# Patient Record
Sex: FEMALE | Race: OTHER | Employment: FULL TIME | URBAN - METROPOLITAN AREA
[De-identification: names, ages, dates, MRNs, and addresses within clinical notes are randomized per-mention and may not be internally consistent; named-entity substitution may affect disease eponyms.]

---

## 2020-11-23 ENCOUNTER — OFFICE VISIT (OUTPATIENT)
Dept: FAMILY MEDICINE CLINIC | Facility: CLINIC | Age: 25
End: 2020-11-23
Payer: COMMERCIAL

## 2020-11-23 VITALS
BODY MASS INDEX: 23.05 KG/M2 | SYSTOLIC BLOOD PRESSURE: 100 MMHG | WEIGHT: 145.13 LBS | TEMPERATURE: 98 F | HEART RATE: 70 BPM | HEIGHT: 66.5 IN | DIASTOLIC BLOOD PRESSURE: 60 MMHG | RESPIRATION RATE: 16 BRPM

## 2020-11-23 DIAGNOSIS — Z00.00 WELL WOMAN EXAM (NO GYNECOLOGICAL EXAM): Primary | ICD-10-CM

## 2020-11-23 PROCEDURE — 99385 PREV VISIT NEW AGE 18-39: CPT | Performed by: NURSE PRACTITIONER

## 2020-11-23 PROCEDURE — 3078F DIAST BP <80 MM HG: CPT | Performed by: NURSE PRACTITIONER

## 2020-11-23 PROCEDURE — 3008F BODY MASS INDEX DOCD: CPT | Performed by: NURSE PRACTITIONER

## 2020-11-23 PROCEDURE — 3074F SYST BP LT 130 MM HG: CPT | Performed by: NURSE PRACTITIONER

## 2020-11-23 NOTE — PROGRESS NOTES
Maki Nnia is a 31year old female who presents to establish with our practice and for a complete physical exam:       Patient has no acute complaints. Feels well overall. Is , has no children. 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   mood changes or suicidal/homicidal ideations      EXAM:   /60   Pulse 70   Temp 98.2 °F (36.8 °C) (Temporal)   Resp 16   Ht 66.5\"   Wt 145 lb 2 oz (65.8 kg)   BMI 23.07 kg/m²   Body mass index is 23.07 kg/m².    GENERAL: well developed, well nourishe

## 2020-11-30 ENCOUNTER — APPOINTMENT (OUTPATIENT)
Dept: LAB | Age: 25
End: 2020-11-30
Attending: NURSE PRACTITIONER
Payer: COMMERCIAL

## 2020-12-03 ENCOUNTER — TELEPHONE (OUTPATIENT)
Dept: FAMILY MEDICINE CLINIC | Facility: CLINIC | Age: 25
End: 2020-12-03

## 2020-12-03 NOTE — TELEPHONE ENCOUNTER
Patient completed medical records release form to obtain records from previous provider, [facility].  Release faxed to [facility] [phone]

## 2021-02-04 ENCOUNTER — TELEPHONE (OUTPATIENT)
Dept: OBGYN CLINIC | Facility: CLINIC | Age: 26
End: 2021-02-04

## 2021-02-04 NOTE — TELEPHONE ENCOUNTER
Patient calling to initiate prenatal care  [id number]  Patient is 7-8 weeks on 03-04-21  Confirmation Ultrasound and Appointment scheduled on 03-08-21  [facility]   Good time to return phone call anytime

## 2021-02-05 NOTE — TELEPHONE ENCOUNTER
LMP: 2021    EDC based on lmp: 10/08/2021    8 wks on 2021        Are cycles regular?: Yes    Medical problems: None    Past sx hx: None    Current medications: PNV    Past pregnancy complications: NA    Current concerns: Patient had quest

## 2021-03-08 ENCOUNTER — ULTRASOUND ENCOUNTER (OUTPATIENT)
Dept: OBGYN CLINIC | Facility: CLINIC | Age: 26
End: 2021-03-08

## 2021-03-08 ENCOUNTER — OFFICE VISIT (OUTPATIENT)
Dept: OBGYN CLINIC | Facility: CLINIC | Age: 26
End: 2021-03-08

## 2021-03-08 VITALS
DIASTOLIC BLOOD PRESSURE: 64 MMHG | BODY MASS INDEX: 23.2 KG/M2 | HEIGHT: 66.25 IN | WEIGHT: 144.38 LBS | SYSTOLIC BLOOD PRESSURE: 118 MMHG | HEART RATE: 88 BPM

## 2021-03-08 DIAGNOSIS — N91.2 AMENORRHEA: Primary | ICD-10-CM

## 2021-03-08 DIAGNOSIS — Z32.01 PREGNANCY EXAMINATION OR TEST, POSITIVE RESULT: ICD-10-CM

## 2021-03-08 PROCEDURE — 76856 US EXAM PELVIC COMPLETE: CPT | Performed by: OBSTETRICS & GYNECOLOGY

## 2021-03-08 PROCEDURE — 3078F DIAST BP <80 MM HG: CPT | Performed by: OBSTETRICS & GYNECOLOGY

## 2021-03-08 PROCEDURE — 99203 OFFICE O/P NEW LOW 30 MIN: CPT | Performed by: OBSTETRICS & GYNECOLOGY

## 2021-03-08 PROCEDURE — 3008F BODY MASS INDEX DOCD: CPT | Performed by: OBSTETRICS & GYNECOLOGY

## 2021-03-08 PROCEDURE — 3074F SYST BP LT 130 MM HG: CPT | Performed by: OBSTETRICS & GYNECOLOGY

## 2021-03-08 RX ORDER — PRENATAL VIT/IRON FUM/FOLIC AC 27MG-0.8MG
1 TABLET ORAL DAILY
COMMUNITY

## 2021-03-08 NOTE — PATIENT INSTRUCTIONS
We discussed the available data which suggests that symptomatic pregnant patients with COVID-19 are at increased risk of more severe illness compared to nonpregnant women.  Fortunately, the absolute risk for severe COVID-19 is low, the data indicates an i testing, and it indicates that there might be a problem, and you subsequently decide not to do invasive testing such as an amniocentesis, would you worry excessively through the remainder of the pregnancy?     The following tests are available to screen for child. A blood test is performed on the mother, and if her test is positive, then the father should also be tested. These tests can be done at any time in the pregnancy, usually in the first trimester with your initial OB labs.  All babies are screened for [facility] which looks at many of the baby’s internal structures. Abnormalities in certain structures have been associated with an increased risk of genetic abnormalities. It also screens for NTD’s.  This ultrasound would replace the Level I Ultrasound that w meats in a package to 165 degrees Fahrenheit before eating, even if the label states the meat is “pre-cooked”. • Soft Cheeses and Unpasteurized Products - You may eat soft cheeses that are pasteurized.   Please avoid all soft cheeses, milk or juice that bleeding, or a watery discharge. Constipation: Metamucil, Colace, fiber supplement, Milk of Magnesia or Dulcolax  Drink plenty of fluids, eat high-fiber foods and take the above laxatives sporadically.      Headache or Mild aches and pain: Extra Strength

## 2021-03-08 NOTE — PROGRESS NOTES
[facility]  Obstetrics and Gynecology    Subjective:     Jaye Medley is a 31year old  female presents with c/o secondary amenorrhea and positive pregnancy test. The patient was recommended to return for further evaluation.  The patient Trimester US  GA by LMP: 9w3d  GA by US: 8w5d  CRL: 21.44 mm  FHT: 167 bpm  Impression: Single live IUP. Early viable. Gestational sac, yolk sac and fetal pole seen. Both ovaries wnl. No free fluid. Cardiac activity noted. Cervix appears closed and wnl.

## 2021-03-25 ENCOUNTER — TELEPHONE (OUTPATIENT)
Dept: OBGYN CLINIC | Facility: CLINIC | Age: 26
End: 2021-03-25

## 2021-03-25 NOTE — TELEPHONE ENCOUNTER
EDC 10/8/21; NOB scheduled for 3/29/21. Pt reports intermittent dull aching in pelvis. Pt denies any severe pain. Pt denies any spotting or bleeding.   Pt denies urinary problems but reports intermittent constipation alternating with loose stools which m

## 2021-03-25 NOTE — TELEPHONE ENCOUNTER
PT has questions regarding vaccinations    Future Appointments   Date Time Provider Patrick Brown   3/29/2021  2:45 PM Yesenia Gutierrez MD [facility]

## 2021-04-08 ENCOUNTER — INITIAL PRENATAL (OUTPATIENT)
Dept: OBGYN CLINIC | Facility: CLINIC | Age: 26
End: 2021-04-08

## 2021-04-08 VITALS
HEIGHT: 66.25 IN | BODY MASS INDEX: 23.92 KG/M2 | HEART RATE: 100 BPM | DIASTOLIC BLOOD PRESSURE: 72 MMHG | SYSTOLIC BLOOD PRESSURE: 110 MMHG | WEIGHT: 148.81 LBS

## 2021-04-08 DIAGNOSIS — Z12.4 ENCOUNTER FOR SCREENING FOR CERVICAL CANCER: ICD-10-CM

## 2021-04-08 DIAGNOSIS — N89.8 VAGINAL DISCHARGE: ICD-10-CM

## 2021-04-08 DIAGNOSIS — Z34.00 SUPERVISION OF NORMAL FIRST PREGNANCY, ANTEPARTUM: Primary | ICD-10-CM

## 2021-04-08 DIAGNOSIS — B37.9 YEAST INFECTION: ICD-10-CM

## 2021-04-08 DIAGNOSIS — N89.8 VAGINAL IRRITATION: ICD-10-CM

## 2021-04-08 PROCEDURE — 87086 URINE CULTURE/COLONY COUNT: CPT | Performed by: OBSTETRICS & GYNECOLOGY

## 2021-04-08 PROCEDURE — 81002 URINALYSIS NONAUTO W/O SCOPE: CPT | Performed by: OBSTETRICS & GYNECOLOGY

## 2021-04-08 PROCEDURE — 87624 HPV HI-RISK TYP POOLED RSLT: CPT | Performed by: OBSTETRICS & GYNECOLOGY

## 2021-04-08 PROCEDURE — 87480 CANDIDA DNA DIR PROBE: CPT | Performed by: OBSTETRICS & GYNECOLOGY

## 2021-04-08 PROCEDURE — 87491 CHLMYD TRACH DNA AMP PROBE: CPT | Performed by: OBSTETRICS & GYNECOLOGY

## 2021-04-08 PROCEDURE — 87591 N.GONORRHOEAE DNA AMP PROB: CPT | Performed by: OBSTETRICS & GYNECOLOGY

## 2021-04-08 PROCEDURE — 87389 HIV-1 AG W/HIV-1&-2 AB AG IA: CPT | Performed by: OBSTETRICS & GYNECOLOGY

## 2021-04-08 PROCEDURE — 88175 CYTOPATH C/V AUTO FLUID REDO: CPT | Performed by: OBSTETRICS & GYNECOLOGY

## 2021-04-08 PROCEDURE — 86900 BLOOD TYPING SEROLOGIC ABO: CPT | Performed by: OBSTETRICS & GYNECOLOGY

## 2021-04-08 PROCEDURE — 86762 RUBELLA ANTIBODY: CPT | Performed by: OBSTETRICS & GYNECOLOGY

## 2021-04-08 PROCEDURE — 87340 HEPATITIS B SURFACE AG IA: CPT | Performed by: OBSTETRICS & GYNECOLOGY

## 2021-04-08 PROCEDURE — 86780 TREPONEMA PALLIDUM: CPT | Performed by: OBSTETRICS & GYNECOLOGY

## 2021-04-08 PROCEDURE — 3074F SYST BP LT 130 MM HG: CPT | Performed by: OBSTETRICS & GYNECOLOGY

## 2021-04-08 PROCEDURE — 87660 TRICHOMONAS VAGIN DIR PROBE: CPT | Performed by: OBSTETRICS & GYNECOLOGY

## 2021-04-08 PROCEDURE — 87510 GARDNER VAG DNA DIR PROBE: CPT | Performed by: OBSTETRICS & GYNECOLOGY

## 2021-04-08 PROCEDURE — 86850 RBC ANTIBODY SCREEN: CPT | Performed by: OBSTETRICS & GYNECOLOGY

## 2021-04-08 PROCEDURE — 86901 BLOOD TYPING SEROLOGIC RH(D): CPT | Performed by: OBSTETRICS & GYNECOLOGY

## 2021-04-08 PROCEDURE — 3078F DIAST BP <80 MM HG: CPT | Performed by: OBSTETRICS & GYNECOLOGY

## 2021-04-08 PROCEDURE — 85025 COMPLETE CBC W/AUTO DIFF WBC: CPT | Performed by: OBSTETRICS & GYNECOLOGY

## 2021-04-08 PROCEDURE — 3008F BODY MASS INDEX DOCD: CPT | Performed by: OBSTETRICS & GYNECOLOGY

## 2021-04-08 NOTE — TELEPHONE ENCOUNTER
Per notes, patient had negative rapid test 2 weeks ago. If patient has not had a positive test and has no symptoms, OK to be seen.

## 2021-04-08 NOTE — PATIENT INSTRUCTIONS
Foods to Avoid During Pregnancy  • Deli Meats- You may eat deli meats from the deli. If you eat deli meats in the package, it may be contaminated with Listeria.  Heat all deli meats in a package to 165 degrees Fahrenheit before eating, even if the label st Annusol, Sitz bath or Witch hazel  Eat a high fiber diet and drink plenty of fluids. Yeast: Monistat 3 or 7  Call if your symptoms do not improve, or if you experience vaginal bleeding, or a watery discharge.     Constipation: Metamucil, Colace, fiber nagel

## 2021-04-08 NOTE — PROGRESS NOTES
[facility]  Obstetrics and Gynecology  History & Physical    CC: Patient is here to establish prenatal care     Subjective:     HPI:  Jamaal Barker is a 31year old  female at [address] [place] [address] who presents today to establish prenatal care.  [employer]:  General:  denies fevers, chills, fatigue and malaise  Eyes:  no visual changes, denies headaches  Breast:  denies rashes, skin changes, pain, lumps or discharge   Respiratory:  denies SOB, dyspnea, cough or wheezing  Cardiovascular:  denies chest Amniocentesis and declined. Pt declines genetic testing.       Patient education  - Pt counseled on safety, diet, exercise, caffiene, tobacco, ETOH, sexual activity, ER precautions, diet, exercise, appropriate weight gain, travel, appropriate otc medication

## 2021-04-09 PROBLEM — B37.9 YEAST INFECTION: Status: ACTIVE | Noted: 2021-04-09

## 2021-05-03 ENCOUNTER — ROUTINE PRENATAL (OUTPATIENT)
Dept: OBGYN CLINIC | Facility: CLINIC | Age: 26
End: 2021-05-03

## 2021-05-03 VITALS — BODY MASS INDEX: 24 KG/M2 | DIASTOLIC BLOOD PRESSURE: 66 MMHG | SYSTOLIC BLOOD PRESSURE: 122 MMHG | WEIGHT: 151 LBS

## 2021-05-03 DIAGNOSIS — Z34.00 SUPERVISION OF NORMAL FIRST PREGNANCY, ANTEPARTUM: Primary | ICD-10-CM

## 2021-05-03 DIAGNOSIS — D72.828 OTHER ELEVATED WHITE BLOOD CELL (WBC) COUNT: ICD-10-CM

## 2021-05-03 PROBLEM — O09.899 RUBELLA NON-IMMUNE STATUS, ANTEPARTUM (HCC): Status: ACTIVE | Noted: 2021-05-03

## 2021-05-03 PROBLEM — O09.899 RUBELLA NON-IMMUNE STATUS, ANTEPARTUM: Status: ACTIVE | Noted: 2021-05-03

## 2021-05-03 PROBLEM — Z28.3 RUBELLA NON-IMMUNE STATUS, ANTEPARTUM: Status: ACTIVE | Noted: 2021-05-03

## 2021-05-03 PROBLEM — O99.891 RUBELLA NON-IMMUNE STATUS, ANTEPARTUM: Status: ACTIVE | Noted: 2021-05-03

## 2021-05-03 PROBLEM — Z28.39 RUBELLA NON-IMMUNE STATUS, ANTEPARTUM (HCC): Status: ACTIVE | Noted: 2021-05-03

## 2021-05-03 PROBLEM — Z28.39 RUBELLA NON-IMMUNE STATUS, ANTEPARTUM: Status: ACTIVE | Noted: 2021-05-03

## 2021-05-03 PROCEDURE — 85025 COMPLETE CBC W/AUTO DIFF WBC: CPT | Performed by: OBSTETRICS & GYNECOLOGY

## 2021-05-03 PROCEDURE — 81002 URINALYSIS NONAUTO W/O SCOPE: CPT | Performed by: OBSTETRICS & GYNECOLOGY

## 2021-05-03 PROCEDURE — 3078F DIAST BP <80 MM HG: CPT | Performed by: OBSTETRICS & GYNECOLOGY

## 2021-05-03 PROCEDURE — 3074F SYST BP LT 130 MM HG: CPT | Performed by: OBSTETRICS & GYNECOLOGY

## 2021-05-03 NOTE — PROGRESS NOTES
Patient presents with:  Prenatal Care    Routine prenatal visit without complaints. Patient denies any bleeding, leaking fluid, cramping, or contractions. Good fetal movement.   Assessment/Plan:  17w3d doing well  Ultrasound next  Recommend avoid repetitiv

## 2021-05-26 ENCOUNTER — ROUTINE PRENATAL (OUTPATIENT)
Dept: OBGYN CLINIC | Facility: CLINIC | Age: 26
End: 2021-05-26

## 2021-05-26 ENCOUNTER — ULTRASOUND ENCOUNTER (OUTPATIENT)
Dept: OBGYN CLINIC | Facility: CLINIC | Age: 26
End: 2021-05-26

## 2021-05-26 VITALS — BODY MASS INDEX: 25 KG/M2 | DIASTOLIC BLOOD PRESSURE: 62 MMHG | WEIGHT: 155.81 LBS | SYSTOLIC BLOOD PRESSURE: 116 MMHG

## 2021-05-26 DIAGNOSIS — Z36.9 ANTENATAL SCREENING ENCOUNTER: ICD-10-CM

## 2021-05-26 DIAGNOSIS — Z3A.20 20 WEEKS GESTATION OF PREGNANCY: Primary | ICD-10-CM

## 2021-05-26 DIAGNOSIS — Z36.89 SCREENING, ANTENATAL, FOR FETAL ANATOMIC SURVEY: ICD-10-CM

## 2021-05-26 PROCEDURE — 76805 OB US >/= 14 WKS SNGL FETUS: CPT | Performed by: OBSTETRICS & GYNECOLOGY

## 2021-05-26 PROCEDURE — 3074F SYST BP LT 130 MM HG: CPT | Performed by: OBSTETRICS & GYNECOLOGY

## 2021-05-26 PROCEDURE — 81002 URINALYSIS NONAUTO W/O SCOPE: CPT | Performed by: OBSTETRICS & GYNECOLOGY

## 2021-05-26 PROCEDURE — 3078F DIAST BP <80 MM HG: CPT | Performed by: OBSTETRICS & GYNECOLOGY

## 2021-05-26 NOTE — PROGRESS NOTES
Patient presents with:  Prenatal Care: FRANCIA after US     Routine prenatal visit. Patient without complaints. Good fetal movement. Patient denies any bleeding, leaking fluid, cramping, or contractions.     Assessment/Plan:  20w5d doing well  1 hr GTT and CBC

## 2021-06-07 ENCOUNTER — LAB ENCOUNTER (OUTPATIENT)
Dept: LAB | Age: 26
End: 2021-06-07
Attending: OBSTETRICS & GYNECOLOGY
Payer: COMMERCIAL

## 2021-06-07 DIAGNOSIS — Z36.9 ANTENATAL SCREENING ENCOUNTER: ICD-10-CM

## 2021-06-07 PROCEDURE — 82950 GLUCOSE TEST: CPT

## 2021-06-07 PROCEDURE — 36415 COLL VENOUS BLD VENIPUNCTURE: CPT

## 2021-06-07 PROCEDURE — 85025 COMPLETE CBC W/AUTO DIFF WBC: CPT

## 2021-06-21 ENCOUNTER — ROUTINE PRENATAL (OUTPATIENT)
Dept: OBGYN CLINIC | Facility: CLINIC | Age: 26
End: 2021-06-21

## 2021-06-21 VITALS — WEIGHT: 161.81 LBS | BODY MASS INDEX: 26 KG/M2 | DIASTOLIC BLOOD PRESSURE: 60 MMHG | SYSTOLIC BLOOD PRESSURE: 108 MMHG

## 2021-06-21 DIAGNOSIS — Z34.00 SUPERVISION OF NORMAL FIRST PREGNANCY, ANTEPARTUM: Primary | ICD-10-CM

## 2021-06-21 PROBLEM — O99.013 ANEMIA DURING PREGNANCY IN THIRD TRIMESTER: Status: ACTIVE | Noted: 2021-06-21

## 2021-06-21 PROBLEM — O99.013 ANEMIA DURING PREGNANCY IN THIRD TRIMESTER (HCC): Status: ACTIVE | Noted: 2021-06-21

## 2021-06-21 PROCEDURE — 81002 URINALYSIS NONAUTO W/O SCOPE: CPT | Performed by: OBSTETRICS & GYNECOLOGY

## 2021-06-21 PROCEDURE — 3074F SYST BP LT 130 MM HG: CPT | Performed by: OBSTETRICS & GYNECOLOGY

## 2021-06-21 PROCEDURE — 3078F DIAST BP <80 MM HG: CPT | Performed by: OBSTETRICS & GYNECOLOGY

## 2021-06-21 NOTE — PATIENT INSTRUCTIONS
Tdap Vaccine: What You Need To Know    1. Why Get Vaccinated:    · Tetanus, diphtheria, and pertussis can be very serious diseases, even for adolescents and adults. Tdap vaccine can protect us from these diseases.     · Tetanus (Lockjaw) causes painful mu tetanus infection.           Pediatricians:     [address] [facility] [address]   Maria Alejandra, [facility]     [address],  [address]   Maria Alejandra, [address] [place] [address] Tome Dexter Bolanos [address]   N

## 2021-06-21 NOTE — PROGRESS NOTES
FRANCIA 24w2d    Doing well, +FM  No contractions   LOF, VB      1. FHT-present  2. PNL:  1 hour/CBC done too early. Aware she will repeat it  3. Anemia: she is concerned the amount of dairy she eats may be negating the daily iron she is taking.  She is taking

## 2021-06-22 ENCOUNTER — TELEPHONE (OUTPATIENT)
Dept: OBGYN CLINIC | Facility: CLINIC | Age: 26
End: 2021-06-22

## 2021-06-22 NOTE — TELEPHONE ENCOUNTER
----- Message from Wesley Jerome MD sent at 6/21/2021  5:44 PM CDT -----  Regarding: breast pump  Pt needs breast pump referral, please help. Thanks!

## 2021-07-19 ENCOUNTER — LAB ENCOUNTER (OUTPATIENT)
Dept: LAB | Age: 26
End: 2021-07-19
Attending: OBSTETRICS & GYNECOLOGY
Payer: COMMERCIAL

## 2021-07-19 ENCOUNTER — ROUTINE PRENATAL (OUTPATIENT)
Dept: OBGYN CLINIC | Facility: CLINIC | Age: 26
End: 2021-07-19

## 2021-07-19 ENCOUNTER — TELEPHONE (OUTPATIENT)
Dept: OBGYN CLINIC | Facility: CLINIC | Age: 26
End: 2021-07-19

## 2021-07-19 VITALS
HEIGHT: 66.25 IN | SYSTOLIC BLOOD PRESSURE: 122 MMHG | BODY MASS INDEX: 26.84 KG/M2 | DIASTOLIC BLOOD PRESSURE: 78 MMHG | WEIGHT: 167 LBS

## 2021-07-19 DIAGNOSIS — Z34.90 PREGNANCY, UNSPECIFIED GESTATIONAL AGE: ICD-10-CM

## 2021-07-19 DIAGNOSIS — Z34.82 ENCOUNTER FOR SUPERVISION OF OTHER NORMAL PREGNANCY IN SECOND TRIMESTER: Primary | ICD-10-CM

## 2021-07-19 DIAGNOSIS — Z36.9 ANTENATAL SCREENING ENCOUNTER: ICD-10-CM

## 2021-07-19 DIAGNOSIS — Z34.00 SUPERVISION OF NORMAL FIRST PREGNANCY, ANTEPARTUM: ICD-10-CM

## 2021-07-19 DIAGNOSIS — Z23 NEED FOR VACCINATION: ICD-10-CM

## 2021-07-19 LAB
BASOPHILS # BLD AUTO: 0.04 X10(3) UL (ref 0–0.2)
BASOPHILS NFR BLD AUTO: 0.3 %
DEPRECATED RDW RBC AUTO: 48.5 FL (ref 35.1–46.3)
EOSINOPHIL # BLD AUTO: 0.19 X10(3) UL (ref 0–0.7)
EOSINOPHIL NFR BLD AUTO: 1.3 %
ERYTHROCYTE [DISTWIDTH] IN BLOOD BY AUTOMATED COUNT: 14.6 % (ref 11–15)
GLUCOSE (URINE DIPSTICK): NEGATIVE MG/DL
GLUCOSE 1H P GLC SERPL-MCNC: 96 MG/DL
HCT VFR BLD AUTO: 33.3 %
HGB BLD-MCNC: 11.3 G/DL
IMM GRANULOCYTES # BLD AUTO: 0.16 X10(3) UL (ref 0–1)
IMM GRANULOCYTES NFR BLD: 1.1 %
LYMPHOCYTES # BLD AUTO: 3.24 X10(3) UL (ref 1–4)
LYMPHOCYTES NFR BLD AUTO: 21.3 %
MCH RBC QN AUTO: 30.8 PG (ref 26–34)
MCHC RBC AUTO-ENTMCNC: 33.9 G/DL (ref 31–37)
MCV RBC AUTO: 90.7 FL
MONOCYTES # BLD AUTO: 1.15 X10(3) UL (ref 0.1–1)
MONOCYTES NFR BLD AUTO: 7.6 %
MULTISTIX LOT#: NORMAL NUMERIC
NEUTROPHILS # BLD AUTO: 10.4 X10 (3) UL (ref 1.5–7.7)
NEUTROPHILS # BLD AUTO: 10.4 X10(3) UL (ref 1.5–7.7)
NEUTROPHILS NFR BLD AUTO: 68.4 %
PLATELET # BLD AUTO: 215 10(3)UL (ref 150–450)
RBC # BLD AUTO: 3.67 X10(6)UL
WBC # BLD AUTO: 15.2 X10(3) UL (ref 4–11)

## 2021-07-19 PROCEDURE — 90471 IMMUNIZATION ADMIN: CPT | Performed by: OBSTETRICS & GYNECOLOGY

## 2021-07-19 PROCEDURE — 90715 TDAP VACCINE 7 YRS/> IM: CPT | Performed by: OBSTETRICS & GYNECOLOGY

## 2021-07-19 PROCEDURE — 3078F DIAST BP <80 MM HG: CPT | Performed by: OBSTETRICS & GYNECOLOGY

## 2021-07-19 PROCEDURE — 85025 COMPLETE CBC W/AUTO DIFF WBC: CPT

## 2021-07-19 PROCEDURE — 36415 COLL VENOUS BLD VENIPUNCTURE: CPT

## 2021-07-19 PROCEDURE — 3008F BODY MASS INDEX DOCD: CPT | Performed by: OBSTETRICS & GYNECOLOGY

## 2021-07-19 PROCEDURE — 81002 URINALYSIS NONAUTO W/O SCOPE: CPT | Performed by: OBSTETRICS & GYNECOLOGY

## 2021-07-19 PROCEDURE — 3074F SYST BP LT 130 MM HG: CPT | Performed by: OBSTETRICS & GYNECOLOGY

## 2021-07-19 PROCEDURE — 82950 GLUCOSE TEST: CPT

## 2021-07-19 NOTE — PATIENT INSTRUCTIONS
LABOR & DELIVERY PRE-REGISTRATION    Thank you for choosing [facility] for you labor and delivery needs. We look forward to caring for you and your family in this exciting time.   In order to better care for all of our patients, [facility] re CHART    Although not all women need to perform daily fetal movement counts, if you notice a decrease in fetal activity, you should contact our office immediately. Begin counting fetal movements at 32 weeks of pregnancy.   You may find that your baby i that all pregnant women receive a Tdap vaccine during pregnancy, regardless of whether you have received one previously.   The vaccine reduces your chances of frandy the illness, and also provides some natural immunity to your baby for possible exposur Covid-19 infection and have a subsequent higher risk for the following:   - severe illness   - adverse pregnancy outcomes including  birth   - hospitalization, ICU admission, need for mechanical ventilation and death  [2] The mRNA vaccines that are

## 2021-07-19 NOTE — TELEPHONE ENCOUNTER
----- Message from Dov King MD sent at 7/19/2021  1:28 PM CDT -----  Needs referral to lactation consultant.   [facility]

## 2021-07-19 NOTE — PROGRESS NOTES
Patient presents with:  Prenatal Care: FRANCIA    Routine prenatal visit. Patient without complaints. Good fetal movement  Patient denies any bleeding, leaking fluid, cramping, or contractions.     Assessment/Plan:  28w3d doing well  Desires referral to kanika

## 2021-08-02 ENCOUNTER — ROUTINE PRENATAL (OUTPATIENT)
Dept: OBGYN CLINIC | Facility: CLINIC | Age: 26
End: 2021-08-02

## 2021-08-02 VITALS
WEIGHT: 172 LBS | SYSTOLIC BLOOD PRESSURE: 110 MMHG | DIASTOLIC BLOOD PRESSURE: 70 MMHG | BODY MASS INDEX: 27.64 KG/M2 | HEIGHT: 66.25 IN

## 2021-08-02 DIAGNOSIS — Z36.9 PRENATAL SCREENING ENCOUNTER: Primary | ICD-10-CM

## 2021-08-02 DIAGNOSIS — Z34.00 SUPERVISION OF NORMAL FIRST PREGNANCY, ANTEPARTUM: ICD-10-CM

## 2021-08-02 LAB
GLUCOSE (URINE DIPSTICK): NEGATIVE MG/DL
MULTISTIX LOT#: NORMAL NUMERIC

## 2021-08-02 PROCEDURE — 3008F BODY MASS INDEX DOCD: CPT | Performed by: OBSTETRICS & GYNECOLOGY

## 2021-08-02 PROCEDURE — 3078F DIAST BP <80 MM HG: CPT | Performed by: OBSTETRICS & GYNECOLOGY

## 2021-08-02 PROCEDURE — 81002 URINALYSIS NONAUTO W/O SCOPE: CPT | Performed by: OBSTETRICS & GYNECOLOGY

## 2021-08-02 PROCEDURE — 3074F SYST BP LT 130 MM HG: CPT | Performed by: OBSTETRICS & GYNECOLOGY

## 2021-08-02 RX ORDER — MELATONIN
325
COMMUNITY

## 2021-08-02 NOTE — PROGRESS NOTES
FRANCIA    GA: 30w3d   21  1504   BP: 110/70   Weight: 172 lb (78 kg)   Height: 66.25\"       Doing well, +FM  Denies LOF/VB/uctx  Rh positive, TDAP received, EPDS 0  Fetal movement count given  Pt reports she is likely to stop working at 38 weeks

## 2021-08-09 ENCOUNTER — NURSE ONLY (OUTPATIENT)
Dept: LACTATION | Facility: HOSPITAL | Age: 26
End: 2021-08-09
Attending: OBSTETRICS & GYNECOLOGY
Payer: COMMERCIAL

## 2021-08-09 VITALS — TEMPERATURE: 98 F

## 2021-08-09 DIAGNOSIS — Z71.89 PRENATAL CONSULT: Primary | ICD-10-CM

## 2021-08-09 PROCEDURE — 99213 OFFICE O/P EST LOW 20 MIN: CPT

## 2021-08-09 NOTE — PROGRESS NOTES
Condition is stable and well controlled. Continue with ferrous sulfate 325 mg PO QD. Labs ordered. Pending lab results, we will reassess the condition at next regular appointment. LACTATION NOTE - MOTHER      Evaluation Type: Outpatient Initial (Prenatal education visit)    Problems identified  Problems identified: Knowledge deficit  Problems Identified Other: Patient desires assistance with personal breast pump    Maternal history

## 2021-08-17 ENCOUNTER — ROUTINE PRENATAL (OUTPATIENT)
Dept: OBGYN CLINIC | Facility: CLINIC | Age: 26
End: 2021-08-17

## 2021-08-17 VITALS
SYSTOLIC BLOOD PRESSURE: 100 MMHG | WEIGHT: 172 LBS | HEIGHT: 66.25 IN | BODY MASS INDEX: 27.64 KG/M2 | DIASTOLIC BLOOD PRESSURE: 60 MMHG

## 2021-08-17 DIAGNOSIS — Z34.90 PRENATAL CARE, ANTEPARTUM: Primary | ICD-10-CM

## 2021-08-17 DIAGNOSIS — Z34.00 SUPERVISION OF NORMAL FIRST PREGNANCY, ANTEPARTUM: ICD-10-CM

## 2021-08-17 PROBLEM — B37.9 YEAST INFECTION: Status: RESOLVED | Noted: 2021-04-09 | Resolved: 2021-08-17

## 2021-08-17 LAB
GLUCOSE (URINE DIPSTICK): NEGATIVE MG/DL
MULTISTIX LOT#: NORMAL NUMERIC

## 2021-08-17 PROCEDURE — 3008F BODY MASS INDEX DOCD: CPT | Performed by: OBSTETRICS & GYNECOLOGY

## 2021-08-17 PROCEDURE — 81002 URINALYSIS NONAUTO W/O SCOPE: CPT | Performed by: OBSTETRICS & GYNECOLOGY

## 2021-08-17 PROCEDURE — 3074F SYST BP LT 130 MM HG: CPT | Performed by: OBSTETRICS & GYNECOLOGY

## 2021-08-17 PROCEDURE — 3078F DIAST BP <80 MM HG: CPT | Performed by: OBSTETRICS & GYNECOLOGY

## 2021-08-17 PROCEDURE — 87389 HIV-1 AG W/HIV-1&-2 AB AG IA: CPT | Performed by: OBSTETRICS & GYNECOLOGY

## 2021-08-17 NOTE — PATIENT INSTRUCTIONS
False Labor  True labor contractions can start unevenly but soon take on a regular pattern. As time goes on, the contractions will get stronger. Also, the intervals between the contractions will get shorter.  Even at the onset, these contractions last at medical advice  Call your healthcare provider right away if any of these occur:  · You are fewer than 37 weeks along in your pregnancy and you’re having contractions. · You have contractions that are regular, getting longer, stronger, and closer together.

## 2021-08-17 NOTE — PROGRESS NOTES
32w4d seen for routine OB visit. Denies ctx, lof, vb. Reports good FM.     Patient Active Problem List:     Supervision of normal first pregnancy, antepartum     Rubella non-immune status, antepartum     Anemia during pregnancy in third trimester       Ge

## 2021-08-30 ENCOUNTER — ROUTINE PRENATAL (OUTPATIENT)
Dept: OBGYN CLINIC | Facility: CLINIC | Age: 26
End: 2021-08-30

## 2021-08-30 VITALS — SYSTOLIC BLOOD PRESSURE: 102 MMHG | BODY MASS INDEX: 28 KG/M2 | WEIGHT: 175 LBS | DIASTOLIC BLOOD PRESSURE: 60 MMHG

## 2021-08-30 DIAGNOSIS — Z3A.34 34 WEEKS GESTATION OF PREGNANCY: Primary | ICD-10-CM

## 2021-08-30 DIAGNOSIS — Z34.00 SUPERVISION OF NORMAL FIRST PREGNANCY, ANTEPARTUM: ICD-10-CM

## 2021-08-30 LAB
GLUCOSE (URINE DIPSTICK): NEGATIVE MG/DL
MULTISTIX LOT#: NORMAL NUMERIC
PROTEIN (URINE DIPSTICK): NEGATIVE MG/DL

## 2021-08-30 PROCEDURE — 3078F DIAST BP <80 MM HG: CPT | Performed by: OBSTETRICS & GYNECOLOGY

## 2021-08-30 PROCEDURE — 3074F SYST BP LT 130 MM HG: CPT | Performed by: OBSTETRICS & GYNECOLOGY

## 2021-08-30 PROCEDURE — 81002 URINALYSIS NONAUTO W/O SCOPE: CPT | Performed by: OBSTETRICS & GYNECOLOGY

## 2021-08-30 NOTE — PROGRESS NOTES
34w3d seen for routine OB visit. Denies ctx, lof, vb. Reports good FM.     Patient Active Problem List:     Supervision of normal first pregnancy, antepartum     Rubella non-immune status, antepartum     Anemia during pregnancy in third trimester       Ge

## 2021-09-02 ENCOUNTER — TELEPHONE (OUTPATIENT)
Dept: OBGYN CLINIC | Facility: CLINIC | Age: 26
End: 2021-09-02

## 2021-09-02 NOTE — TELEPHONE ENCOUNTER
Pt. Dropped off fmla / disability paper work to be completed , form fee paid , pt. Will call with fax number. Placed forms in [facility].

## 2021-09-09 ENCOUNTER — MED REC SCAN ONLY (OUTPATIENT)
Dept: OBGYN CLINIC | Facility: CLINIC | Age: 26
End: 2021-09-09

## 2021-09-09 ENCOUNTER — TELEPHONE (OUTPATIENT)
Dept: OBGYN CLINIC | Facility: CLINIC | Age: 26
End: 2021-09-09

## 2021-09-09 NOTE — TELEPHONE ENCOUNTER
G1/P 0 GA 35 6/7 wks patient complaining of fall at work today at [address] [place] [address] backwards off of something that she was sitting on. Landed on her backside and against a wall. Next QUINN is scheduled for Monday.   Last OV: 8/30/21 quinn with Dr. Emperatriz Talbert

## 2021-09-10 NOTE — TELEPHONE ENCOUNTER
Signed forms received    Copy to scan  Copy to file in [address]  Copy of FMLA faxed to [phone]  Attending physician's statement faxed to Rudy Chapin at [phone]

## 2021-09-13 ENCOUNTER — ROUTINE PRENATAL (OUTPATIENT)
Dept: OBGYN CLINIC | Facility: CLINIC | Age: 26
End: 2021-09-13

## 2021-09-13 VITALS — WEIGHT: 178 LBS | BODY MASS INDEX: 29 KG/M2 | SYSTOLIC BLOOD PRESSURE: 116 MMHG | DIASTOLIC BLOOD PRESSURE: 74 MMHG

## 2021-09-13 DIAGNOSIS — Z34.00 SUPERVISION OF NORMAL FIRST PREGNANCY, ANTEPARTUM: ICD-10-CM

## 2021-09-13 DIAGNOSIS — Z3A.36 36 WEEKS GESTATION OF PREGNANCY: Primary | ICD-10-CM

## 2021-09-13 PROCEDURE — 81002 URINALYSIS NONAUTO W/O SCOPE: CPT | Performed by: NURSE PRACTITIONER

## 2021-09-13 PROCEDURE — 3078F DIAST BP <80 MM HG: CPT | Performed by: NURSE PRACTITIONER

## 2021-09-13 PROCEDURE — 3074F SYST BP LT 130 MM HG: CPT | Performed by: NURSE PRACTITIONER

## 2021-09-13 PROCEDURE — 87081 CULTURE SCREEN ONLY: CPT | Performed by: NURSE PRACTITIONER

## 2021-09-13 NOTE — PROGRESS NOTES
FRANCIA  Doing well, +FM  Denies VB/LOF/uctx  Mode of delivery:  anticipated  Labor precautions discussed  GBS collected   RTC 1 week

## 2021-09-20 ENCOUNTER — ROUTINE PRENATAL (OUTPATIENT)
Dept: OBGYN CLINIC | Facility: CLINIC | Age: 26
End: 2021-09-20

## 2021-09-20 VITALS
WEIGHT: 179.81 LBS | HEIGHT: 66.25 IN | BODY MASS INDEX: 28.9 KG/M2 | SYSTOLIC BLOOD PRESSURE: 112 MMHG | DIASTOLIC BLOOD PRESSURE: 70 MMHG

## 2021-09-20 DIAGNOSIS — Z34.00 SUPERVISION OF NORMAL FIRST PREGNANCY, ANTEPARTUM: Primary | ICD-10-CM

## 2021-09-20 PROBLEM — O99.820 GBS (GROUP B STREPTOCOCCUS CARRIER), +RV CULTURE, CURRENTLY PREGNANT: Status: ACTIVE | Noted: 2021-09-20

## 2021-09-20 PROBLEM — O99.820 GBS (GROUP B STREPTOCOCCUS CARRIER), +RV CULTURE, CURRENTLY PREGNANT (HCC): Status: ACTIVE | Noted: 2021-09-20

## 2021-09-20 PROCEDURE — 3008F BODY MASS INDEX DOCD: CPT | Performed by: OBSTETRICS & GYNECOLOGY

## 2021-09-20 PROCEDURE — 81002 URINALYSIS NONAUTO W/O SCOPE: CPT | Performed by: OBSTETRICS & GYNECOLOGY

## 2021-09-20 PROCEDURE — 3078F DIAST BP <80 MM HG: CPT | Performed by: OBSTETRICS & GYNECOLOGY

## 2021-09-20 PROCEDURE — 3074F SYST BP LT 130 MM HG: CPT | Performed by: OBSTETRICS & GYNECOLOGY

## 2021-09-20 NOTE — PROGRESS NOTES
FRANCIA    GA: 37w3d   21  1421   BP: 112/70   Weight: 179 lb 12.8 oz (81.6 kg)   Height: 66.25\"       Doing well, +FM  Denies LOF/VB/uctx  Mode of delivery:  anticipated  SVE deferred   GBS positive  Fetal movement count given  Labor precauti

## 2021-09-20 NOTE — PATIENT INSTRUCTIONS
Labor Instructions    How do I know if it’s true labor? • One of the most important aspects of any pregnancy is being able to recognize the onset of labor.   Unfortunately, on occasion it can be difficult or confusing, especially if you have had one or mor of the contractions. Having regular (usually closer), longer lasting (35-70 seconds), and sharper (more painful) contractions are the common symptoms of actual labor.   The second way in which labor can begin which occurs in approximately 30% of all patien the room during your labor. During the delivery, the nurses will inform you of the hospital policy and how many coaches are allowed. You may desire pain medication or anesthesia for pain.   You probably discussed some aspects of pain medication with us dur Please call the office within a few days after you are discharged from the hospital to schedule your post-partum visit, which is usually 4-6 weeks after delivery. Any medications necessary will be discussed on an individual basis.   If you decide to armin Time M T W Th F S S                                                                                                           Time M T W Th F S S

## 2021-09-30 ENCOUNTER — ROUTINE PRENATAL (OUTPATIENT)
Dept: OBGYN CLINIC | Facility: CLINIC | Age: 26
End: 2021-09-30

## 2021-09-30 ENCOUNTER — TELEPHONE (OUTPATIENT)
Dept: OBGYN CLINIC | Facility: CLINIC | Age: 26
End: 2021-09-30

## 2021-09-30 VITALS — SYSTOLIC BLOOD PRESSURE: 104 MMHG | DIASTOLIC BLOOD PRESSURE: 70 MMHG | WEIGHT: 181.19 LBS | BODY MASS INDEX: 29 KG/M2

## 2021-09-30 DIAGNOSIS — Z34.00 SUPERVISION OF NORMAL FIRST PREGNANCY, ANTEPARTUM: Primary | ICD-10-CM

## 2021-09-30 DIAGNOSIS — Z3A.38 38 WEEKS GESTATION OF PREGNANCY: ICD-10-CM

## 2021-09-30 DIAGNOSIS — Z23 NEED FOR VACCINATION: ICD-10-CM

## 2021-09-30 LAB
GLUCOSE (URINE DIPSTICK): NEGATIVE MG/DL
MULTISTIX LOT#: 1055 NUMERIC
PROTEIN (URINE DIPSTICK): NEGATIVE MG/DL

## 2021-09-30 PROCEDURE — 90471 IMMUNIZATION ADMIN: CPT | Performed by: OBSTETRICS & GYNECOLOGY

## 2021-09-30 PROCEDURE — 3074F SYST BP LT 130 MM HG: CPT | Performed by: OBSTETRICS & GYNECOLOGY

## 2021-09-30 PROCEDURE — 90686 IIV4 VACC NO PRSV 0.5 ML IM: CPT | Performed by: OBSTETRICS & GYNECOLOGY

## 2021-09-30 PROCEDURE — 81002 URINALYSIS NONAUTO W/O SCOPE: CPT | Performed by: OBSTETRICS & GYNECOLOGY

## 2021-09-30 PROCEDURE — 3078F DIAST BP <80 MM HG: CPT | Performed by: OBSTETRICS & GYNECOLOGY

## 2021-09-30 NOTE — PROGRESS NOTES
FRANCIA - 38w6d    Doing well, +FM  Denies LOF/VB/uctx  /70   Wt 181 lb 3.2 oz (82.2 kg)   LMP 01/01/2021 (Exact Date)   BMI 29.03 kg/m²    Lab Results   Component Value Date    GBS Streptococcus agalactiae (Group B beta strep) (A) 09/13/2021      IOL at

## 2021-10-01 ENCOUNTER — TELEPHONE (OUTPATIENT)
Dept: OBGYN CLINIC | Facility: CLINIC | Age: 26
End: 2021-10-01

## 2021-10-01 DIAGNOSIS — Z01.812 PRE-PROCEDURAL LABORATORY EXAMINATION: Primary | ICD-10-CM

## 2021-10-01 NOTE — TELEPHONE ENCOUNTER
----- Message from [facility], MD sent at 9/30/2021  1:28 PM CDT -----  Cytotec on 10/14 at 7:15 pm

## 2021-10-01 NOTE — TELEPHONE ENCOUNTER
39W0D called asking if OK for prenatal massage. Patient also stated that she lost her mucous plug this morning. She denies any ROM/VB/UCX. + FM.    Last OV:    Pregnancy Complications: NA  Abdominal pain: N  Leaking of fluid: N  Vaginal B

## 2021-10-07 ENCOUNTER — ROUTINE PRENATAL (OUTPATIENT)
Dept: OBGYN CLINIC | Facility: CLINIC | Age: 26
End: 2021-10-07

## 2021-10-07 VITALS — BODY MASS INDEX: 30 KG/M2 | SYSTOLIC BLOOD PRESSURE: 94 MMHG | WEIGHT: 184.81 LBS | DIASTOLIC BLOOD PRESSURE: 66 MMHG

## 2021-10-07 DIAGNOSIS — Z34.90 PRENATAL CARE, ANTEPARTUM: Primary | ICD-10-CM

## 2021-10-07 DIAGNOSIS — Z34.00 SUPERVISION OF NORMAL FIRST PREGNANCY, ANTEPARTUM: ICD-10-CM

## 2021-10-07 PROCEDURE — 81002 URINALYSIS NONAUTO W/O SCOPE: CPT | Performed by: OBSTETRICS & GYNECOLOGY

## 2021-10-07 PROCEDURE — 3078F DIAST BP <80 MM HG: CPT | Performed by: OBSTETRICS & GYNECOLOGY

## 2021-10-07 PROCEDURE — 3074F SYST BP LT 130 MM HG: CPT | Performed by: OBSTETRICS & GYNECOLOGY

## 2021-10-07 NOTE — PROGRESS NOTES
39w6d seen for routine OB visit. Denies ctx, lof, vb. Reports good FM.     Patient Active Problem List:     Supervision of normal first pregnancy, antepartum     Rubella non-immune status, antepartum     Anemia during pregnancy in third trimester

## 2021-10-11 ENCOUNTER — LAB ENCOUNTER (OUTPATIENT)
Dept: LAB | Age: 26
End: 2021-10-11
Attending: OBSTETRICS & GYNECOLOGY
Payer: COMMERCIAL

## 2021-10-11 DIAGNOSIS — Z01.812 PRE-PROCEDURAL LABORATORY EXAMINATION: ICD-10-CM

## 2021-10-14 ENCOUNTER — APPOINTMENT (OUTPATIENT)
Dept: OBGYN CLINIC | Facility: HOSPITAL | Age: 26
End: 2021-10-14
Payer: COMMERCIAL

## 2021-10-14 ENCOUNTER — HOSPITAL ENCOUNTER (INPATIENT)
Facility: HOSPITAL | Age: 26
LOS: 3 days | Discharge: HOME | End: 2021-10-17
Attending: OBSTETRICS & GYNECOLOGY | Admitting: OBSTETRICS & GYNECOLOGY
Payer: COMMERCIAL

## 2021-10-14 PROBLEM — Z34.90 PREGNANCY: Status: ACTIVE | Noted: 2021-10-14

## 2021-10-14 PROBLEM — Z34.90 PREGNANCY (HCC): Status: ACTIVE | Noted: 2021-10-14

## 2021-10-14 PROCEDURE — 3E033VJ INTRODUCTION OF OTHER HORMONE INTO PERIPHERAL VEIN, PERCUTANEOUS APPROACH: ICD-10-PCS | Performed by: OBSTETRICS & GYNECOLOGY

## 2021-10-14 RX ORDER — TERBUTALINE SULFATE 1 MG/ML
0.25 INJECTION, SOLUTION SUBCUTANEOUS AS NEEDED
Status: DISCONTINUED | OUTPATIENT
Start: 2021-10-14 | End: 2021-10-15 | Stop reason: HOSPADM

## 2021-10-14 RX ORDER — HYDROMORPHONE HYDROCHLORIDE 1 MG/ML
INJECTION, SOLUTION INTRAMUSCULAR; INTRAVENOUS; SUBCUTANEOUS EVERY 2 HOUR PRN
Status: DISCONTINUED | OUTPATIENT
Start: 2021-10-14 | End: 2021-10-14 | Stop reason: SDUPTHER

## 2021-10-14 RX ORDER — HYDROMORPHONE HYDROCHLORIDE 1 MG/ML
1 INJECTION, SOLUTION INTRAMUSCULAR; INTRAVENOUS; SUBCUTANEOUS EVERY 2 HOUR PRN
Status: DISCONTINUED | OUTPATIENT
Start: 2021-10-14 | End: 2021-10-15

## 2021-10-14 RX ORDER — HYDROMORPHONE HYDROCHLORIDE 1 MG/ML
0.5 INJECTION, SOLUTION INTRAMUSCULAR; INTRAVENOUS; SUBCUTANEOUS EVERY 2 HOUR PRN
Status: DISCONTINUED | OUTPATIENT
Start: 2021-10-14 | End: 2021-10-15

## 2021-10-14 RX ORDER — ONDANSETRON 2 MG/ML
4 INJECTION INTRAMUSCULAR; INTRAVENOUS EVERY 6 HOURS PRN
Status: DISCONTINUED | OUTPATIENT
Start: 2021-10-14 | End: 2021-10-15 | Stop reason: HOSPADM

## 2021-10-14 RX ORDER — SODIUM CHLORIDE, SODIUM LACTATE, POTASSIUM CHLORIDE, CALCIUM CHLORIDE 600; 310; 30; 20 MG/100ML; MG/100ML; MG/100ML; MG/100ML
INJECTION, SOLUTION INTRAVENOUS CONTINUOUS
Status: DISCONTINUED | OUTPATIENT
Start: 2021-10-14 | End: 2021-10-15 | Stop reason: HOSPADM

## 2021-10-14 RX ORDER — IBUPROFEN 600 MG/1
600 TABLET ORAL EVERY 6 HOURS PRN
Status: DISCONTINUED | OUTPATIENT
Start: 2021-10-14 | End: 2021-10-15 | Stop reason: HOSPADM

## 2021-10-14 RX ORDER — ACETAMINOPHEN 500 MG
500 TABLET ORAL EVERY 6 HOURS PRN
Status: DISCONTINUED | OUTPATIENT
Start: 2021-10-14 | End: 2021-10-15 | Stop reason: HOSPADM

## 2021-10-14 RX ORDER — DEXTROSE, SODIUM CHLORIDE, SODIUM LACTATE, POTASSIUM CHLORIDE, AND CALCIUM CHLORIDE 5; .6; .31; .03; .02 G/100ML; G/100ML; G/100ML; G/100ML; G/100ML
INJECTION, SOLUTION INTRAVENOUS AS NEEDED
Status: DISCONTINUED | OUTPATIENT
Start: 2021-10-14 | End: 2021-10-15 | Stop reason: HOSPADM

## 2021-10-14 RX ORDER — TRISODIUM CITRATE DIHYDRATE AND CITRIC ACID MONOHYDRATE 500; 334 MG/5ML; MG/5ML
30 SOLUTION ORAL AS NEEDED
Status: COMPLETED | OUTPATIENT
Start: 2021-10-14 | End: 2021-10-15

## 2021-10-14 RX ORDER — AMMONIA INHALANTS 0.04 G/.3ML
0.3 INHALANT RESPIRATORY (INHALATION) AS NEEDED
Status: DISCONTINUED | OUTPATIENT
Start: 2021-10-14 | End: 2021-10-15 | Stop reason: HOSPADM

## 2021-10-15 ENCOUNTER — ANESTHESIA EVENT (OUTPATIENT)
Dept: OBGYN UNIT | Facility: HOSPITAL | Age: 26
End: 2021-10-15
Payer: COMMERCIAL

## 2021-10-15 ENCOUNTER — ANESTHESIA (OUTPATIENT)
Dept: OBGYN UNIT | Facility: HOSPITAL | Age: 26
End: 2021-10-15
Payer: COMMERCIAL

## 2021-10-15 PROBLEM — O48.0 POST-TERM PREGNANCY, 40-42 WEEKS OF GESTATION (HCC): Status: ACTIVE | Noted: 2021-10-15

## 2021-10-15 PROBLEM — O48.0 POST-TERM PREGNANCY, 40-42 WEEKS OF GESTATION: Status: ACTIVE | Noted: 2021-10-15

## 2021-10-15 PROCEDURE — 59510 CESAREAN DELIVERY: CPT | Performed by: OBSTETRICS & GYNECOLOGY

## 2021-10-15 PROCEDURE — 3E0P7VZ INTRODUCTION OF HORMONE INTO FEMALE REPRODUCTIVE, VIA NATURAL OR ARTIFICIAL OPENING: ICD-10-PCS | Performed by: OBSTETRICS & GYNECOLOGY

## 2021-10-15 PROCEDURE — 59514 CESAREAN DELIVERY ONLY: CPT | Performed by: OBSTETRICS & GYNECOLOGY

## 2021-10-15 RX ORDER — OXYCODONE HYDROCHLORIDE 5 MG/1
5 TABLET ORAL EVERY 6 HOURS PRN
Status: DISCONTINUED | OUTPATIENT
Start: 2021-10-15 | End: 2021-10-17

## 2021-10-15 RX ORDER — DEXAMETHASONE SODIUM PHOSPHATE 4 MG/ML
VIAL (ML) INJECTION AS NEEDED
Status: DISCONTINUED | OUTPATIENT
Start: 2021-10-15 | End: 2021-10-15 | Stop reason: SURG

## 2021-10-15 RX ORDER — KETOROLAC TROMETHAMINE 30 MG/ML
INJECTION, SOLUTION INTRAMUSCULAR; INTRAVENOUS
Status: COMPLETED
Start: 2021-10-15 | End: 2021-10-15

## 2021-10-15 RX ORDER — SIMETHICONE 80 MG
80 TABLET,CHEWABLE ORAL 3 TIMES DAILY PRN
Status: DISCONTINUED | OUTPATIENT
Start: 2021-10-15 | End: 2021-10-17

## 2021-10-15 RX ORDER — GABAPENTIN 300 MG/1
300 CAPSULE ORAL EVERY 8 HOURS PRN
Status: DISCONTINUED | OUTPATIENT
Start: 2021-10-15 | End: 2021-10-17

## 2021-10-15 RX ORDER — IBUPROFEN 600 MG/1
600 TABLET ORAL EVERY 6 HOURS
Status: DISCONTINUED | OUTPATIENT
Start: 2021-10-16 | End: 2021-10-16

## 2021-10-15 RX ORDER — NICOTINE POLACRILEX 4 MG
LOZENGE BUCCAL
Status: DISPENSED
Start: 2021-10-15 | End: 2021-10-16

## 2021-10-15 RX ORDER — METOCLOPRAMIDE HYDROCHLORIDE 5 MG/ML
10 INJECTION INTRAMUSCULAR; INTRAVENOUS EVERY 6 HOURS PRN
Status: DISCONTINUED | OUTPATIENT
Start: 2021-10-15 | End: 2021-10-17

## 2021-10-15 RX ORDER — ONDANSETRON 2 MG/ML
4 INJECTION INTRAMUSCULAR; INTRAVENOUS EVERY 6 HOURS PRN
Status: DISCONTINUED | OUTPATIENT
Start: 2021-10-15 | End: 2021-10-15

## 2021-10-15 RX ORDER — ONDANSETRON 2 MG/ML
INJECTION INTRAMUSCULAR; INTRAVENOUS AS NEEDED
Status: DISCONTINUED | OUTPATIENT
Start: 2021-10-15 | End: 2021-10-15 | Stop reason: SURG

## 2021-10-15 RX ORDER — SODIUM CHLORIDE, SODIUM LACTATE, POTASSIUM CHLORIDE, CALCIUM CHLORIDE 600; 310; 30; 20 MG/100ML; MG/100ML; MG/100ML; MG/100ML
INJECTION, SOLUTION INTRAVENOUS CONTINUOUS
Status: DISCONTINUED | OUTPATIENT
Start: 2021-10-15 | End: 2021-10-15

## 2021-10-15 RX ORDER — KETOROLAC TROMETHAMINE 30 MG/ML
30 INJECTION, SOLUTION INTRAMUSCULAR; INTRAVENOUS ONCE AS NEEDED
Status: COMPLETED | OUTPATIENT
Start: 2021-10-15 | End: 2021-10-15

## 2021-10-15 RX ORDER — LIDOCAINE HYDROCHLORIDE AND EPINEPHRINE 20; 5 MG/ML; UG/ML
INJECTION, SOLUTION EPIDURAL; INFILTRATION; INTRACAUDAL; PERINEURAL AS NEEDED
Status: DISCONTINUED | OUTPATIENT
Start: 2021-10-15 | End: 2021-10-15 | Stop reason: SURG

## 2021-10-15 RX ORDER — CEFAZOLIN SODIUM/WATER 2 G/20 ML
2 SYRINGE (ML) INTRAVENOUS ONCE
Status: DISCONTINUED | OUTPATIENT
Start: 2021-10-15 | End: 2021-10-15 | Stop reason: HOSPADM

## 2021-10-15 RX ORDER — ONDANSETRON 2 MG/ML
4 INJECTION INTRAMUSCULAR; INTRAVENOUS EVERY 6 HOURS PRN
Status: DISCONTINUED | OUTPATIENT
Start: 2021-10-15 | End: 2021-10-17

## 2021-10-15 RX ORDER — CEFAZOLIN SODIUM/WATER 2 G/20 ML
SYRINGE (ML) INTRAVENOUS
Status: DISPENSED
Start: 2021-10-15 | End: 2021-10-16

## 2021-10-15 RX ORDER — BISACODYL 10 MG
10 SUPPOSITORY, RECTAL RECTAL ONCE AS NEEDED
Status: DISCONTINUED | OUTPATIENT
Start: 2021-10-15 | End: 2021-10-17

## 2021-10-15 RX ORDER — DIPHENHYDRAMINE HYDROCHLORIDE 50 MG/ML
12.5 INJECTION INTRAMUSCULAR; INTRAVENOUS EVERY 4 HOURS PRN
Status: DISCONTINUED | OUTPATIENT
Start: 2021-10-15 | End: 2021-10-17

## 2021-10-15 RX ORDER — NALOXONE HYDROCHLORIDE 0.4 MG/ML
0.08 INJECTION, SOLUTION INTRAMUSCULAR; INTRAVENOUS; SUBCUTANEOUS
Status: ACTIVE | OUTPATIENT
Start: 2021-10-15 | End: 2021-10-16

## 2021-10-15 RX ORDER — ONDANSETRON 2 MG/ML
4 INJECTION INTRAMUSCULAR; INTRAVENOUS ONCE AS NEEDED
Status: DISCONTINUED | OUTPATIENT
Start: 2021-10-15 | End: 2021-10-15 | Stop reason: HOSPADM

## 2021-10-15 RX ORDER — DIPHENHYDRAMINE HYDROCHLORIDE 50 MG/ML
25 INJECTION INTRAMUSCULAR; INTRAVENOUS ONCE AS NEEDED
Status: DISCONTINUED | OUTPATIENT
Start: 2021-10-15 | End: 2021-10-15 | Stop reason: HOSPADM

## 2021-10-15 RX ORDER — DEXTROSE, SODIUM CHLORIDE, SODIUM LACTATE, POTASSIUM CHLORIDE, AND CALCIUM CHLORIDE 5; .6; .31; .03; .02 G/100ML; G/100ML; G/100ML; G/100ML; G/100ML
INJECTION, SOLUTION INTRAVENOUS CONTINUOUS PRN
Status: DISCONTINUED | OUTPATIENT
Start: 2021-10-15 | End: 2021-10-17

## 2021-10-15 RX ORDER — HYDROMORPHONE HYDROCHLORIDE 1 MG/ML
0.5 INJECTION, SOLUTION INTRAMUSCULAR; INTRAVENOUS; SUBCUTANEOUS EVERY 5 MIN PRN
Status: DISCONTINUED | OUTPATIENT
Start: 2021-10-15 | End: 2021-10-15 | Stop reason: HOSPADM

## 2021-10-15 RX ORDER — DIPHENHYDRAMINE HCL 25 MG
25 CAPSULE ORAL EVERY 4 HOURS PRN
Status: DISCONTINUED | OUTPATIENT
Start: 2021-10-15 | End: 2021-10-17

## 2021-10-15 RX ORDER — BUPIVACAINE HCL/0.9 % NACL/PF 0.25 %
5 PLASTIC BAG, INJECTION (ML) EPIDURAL AS NEEDED
Status: DISCONTINUED | OUTPATIENT
Start: 2021-10-15 | End: 2021-10-15

## 2021-10-15 RX ORDER — ACETAMINOPHEN 500 MG
1000 TABLET ORAL EVERY 6 HOURS
Status: DISCONTINUED | OUTPATIENT
Start: 2021-10-15 | End: 2021-10-17

## 2021-10-15 RX ORDER — NALBUPHINE HCL 10 MG/ML
2.5 AMPUL (ML) INJECTION EVERY 4 HOURS PRN
Status: DISCONTINUED | OUTPATIENT
Start: 2021-10-15 | End: 2021-10-17

## 2021-10-15 RX ORDER — DOCUSATE SODIUM 100 MG/1
100 CAPSULE, LIQUID FILLED ORAL
Status: DISCONTINUED | OUTPATIENT
Start: 2021-10-15 | End: 2021-10-17

## 2021-10-15 RX ORDER — NALBUPHINE HCL 10 MG/ML
2.5 AMPUL (ML) INJECTION
Status: DISCONTINUED | OUTPATIENT
Start: 2021-10-15 | End: 2021-10-15

## 2021-10-15 RX ORDER — MORPHINE SULFATE 2 MG/ML
INJECTION, SOLUTION INTRAMUSCULAR; INTRAVENOUS AS NEEDED
Status: DISCONTINUED | OUTPATIENT
Start: 2021-10-15 | End: 2021-10-15 | Stop reason: SURG

## 2021-10-15 RX ORDER — MORPHINE SULFATE 0.5 MG/ML
2 INJECTION, SOLUTION EPIDURAL; INTRATHECAL; INTRAVENOUS ONCE
Status: DISCONTINUED | OUTPATIENT
Start: 2021-10-15 | End: 2021-10-15

## 2021-10-15 RX ORDER — KETOROLAC TROMETHAMINE 30 MG/ML
30 INJECTION, SOLUTION INTRAMUSCULAR; INTRAVENOUS EVERY 6 HOURS
Status: DISCONTINUED | OUTPATIENT
Start: 2021-10-15 | End: 2021-10-16

## 2021-10-15 RX ADMIN — LIDOCAINE HYDROCHLORIDE AND EPINEPHRINE 5 ML: 20; 5 INJECTION, SOLUTION EPIDURAL; INFILTRATION; INTRACAUDAL; PERINEURAL at 12:49:00

## 2021-10-15 RX ADMIN — DEXAMETHASONE SODIUM PHOSPHATE 4 MG: 4 MG/ML VIAL (ML) INJECTION at 12:47:00

## 2021-10-15 RX ADMIN — ONDANSETRON 4 MG: 2 INJECTION INTRAMUSCULAR; INTRAVENOUS at 12:47:00

## 2021-10-15 RX ADMIN — SODIUM CHLORIDE, SODIUM LACTATE, POTASSIUM CHLORIDE, CALCIUM CHLORIDE: 600; 310; 30; 20 INJECTION, SOLUTION INTRAVENOUS at 12:40:00

## 2021-10-15 RX ADMIN — LIDOCAINE HYDROCHLORIDE AND EPINEPHRINE 5 ML: 20; 5 INJECTION, SOLUTION EPIDURAL; INFILTRATION; INTRACAUDAL; PERINEURAL at 12:39:00

## 2021-10-15 RX ADMIN — MORPHINE SULFATE 2 MG: 2 INJECTION, SOLUTION INTRAMUSCULAR; INTRAVENOUS at 13:20:00

## 2021-10-15 RX ADMIN — LIDOCAINE HYDROCHLORIDE AND EPINEPHRINE 5 ML: 20; 5 INJECTION, SOLUTION EPIDURAL; INFILTRATION; INTRACAUDAL; PERINEURAL at 12:45:00

## 2021-10-15 NOTE — ANESTHESIA POSTPROCEDURE EVALUATION
[facility] [facility] Patient Status:  Inpatient   Age/Gender 31year old female MRN [id number]   Location [facility] Attending Jarad Logan MD   Hosp Day # 1 PCP Ritu Rosales MD       Anesthesia Post-op Note

## 2021-10-15 NOTE — OPERATIVE REPORT
[facility]   Section- Operative Report    Jaye Galindo Patient Status:  Inpatient    1995 MRN [id number]   Location [facility] Attending Maxim Montes MD   [facility] Day # 1 PCP Edith Willis MD     Preoper dissection carried down through the layers of the uterus. The uterine cavity was entered bluntly and the incision extended laterally in the lower uterine segment using blunt dissection. Amniotic membranes were then ruptured with clear fluid noted.   The i

## 2021-10-15 NOTE — PLAN OF CARE
Problem: SAFETY ADULT - FALL  Goal: Free from fall injury  Description: INTERVENTIONS:  - Assess pt frequently for physical needs  - Identify cognitive and physical deficits and behaviors that affect risk of falls.   - [place] fall precautions as indica 1350 by Antony Delgadillo, RN  Outcome: Completed  10/15/2021 0816 by Antony Delgadillo, RN  Outcome: Progressing     Problem: ANXIETY  Goal: Will report anxiety at manageable levels  Description: INTERVENTIONS:  - Administer medication as ordered  - Teach and r

## 2021-10-15 NOTE — PROGRESS NOTES
ADMISSION NOTE  Patient admitted to room in stable condition via: cart    Oriented to room, Safety precautions initiated. Bed in low position, call light in reach.  Report received and ID Bands checked & verified with: Kalpana XIAO   Plan of care and safety

## 2021-10-15 NOTE — PLAN OF CARE
Problem: SAFETY ADULT - FALL  Goal: Free from fall injury  Description: INTERVENTIONS:  - Assess pt frequently for physical needs  - Identify cognitive and physical deficits and behaviors that affect risk of falls.   - [place] fall precautions as indica Provide emotional support with 1:1 interaction with staff  Outcome: Progressing     Problem: Patient/Family Goals  Goal: Patient/Family Long Term Goal  Description: Patient's Long Term Goal: safe delivery of infant    Interventions:    - See additional Car

## 2021-10-15 NOTE — PROGRESS NOTES
Patient transferred to mother/baby room 2585 per cart in stable condition with baby and personal belongings. Accompanied by  and staff. Report given to mother/baby RN. Bands checked x 3 with 2 RNs present.  [facility]

## 2021-10-15 NOTE — H&P
[facility]  Obstetrics and Gynecology  Induction History & Physical    Royer Nelson Patient Status:  Inpatient    1995 MRN [id number]   Location [facility] [facility] Attending Radha Patterson 15 Day 1 PCP Supervision of normal first pregnancy, antepartum     Rubella non-immune status, antepartum     Anemia during pregnancy in third trimester     GBS (group B Streptococcus carrier), +RV culture, currently pregnant     Pregnancy     Post-term pregnancy, 40-42

## 2021-10-15 NOTE — ANESTHESIA PROCEDURE NOTES
Labor Analgesia  Performed by: Dakota Jarvis MD  Authorized by: Dakota Jarvis MD       General Information and Staff    Start Time:  10/15/2021 10:51 AM  End Time:  10/15/2021 11:00 AM  Anesthesiologist:  Dakota Jarvis MD  Performed by:   Anesthesiolo

## 2021-10-15 NOTE — ADDENDUM NOTE
Addendum  created 10/15/21 1657 by Emiliano Ramos MD    Flowsheet accepted, Intraprocedure Event edited, [facility] 38 edited

## 2021-10-15 NOTE — PROGRESS NOTES
Patient with repetitive late decelerations for 1 hour. Moderate variability. Latent phase of labor. Recommend proceeding with primary  section for fetal intolerance to labor and persistent late decelerations. Fetal wellbeing reassuring.

## 2021-10-15 NOTE — PROGRESS NOTES
Pt is a 31year old female admitted to 110/110-A. Patient presents with:  Scheduled Induction     Pt is  40w6d intra-uterine pregnancy. History obtained, consents signed. Oriented to room, staff, and plan of care.

## 2021-10-15 NOTE — ANESTHESIA PREPROCEDURE EVALUATION
PRE-OP EVALUATION    Patient Name: Joane Hashimoto    Admit Diagnosis: preg state   Pregnancy    Pre-op Diagnosis        Anesthesia Procedure: LABOR ANALGESIA    * No surgeons found in log *    Pre-op vitals reviewed.   Temp: 98.3 °F (36.8 °C)  Pulse: 79 Intravenous, Q2H PRN        Outpatient Medications:   ferrous sulfate 325 (65 FE) MG Oral Tab EC, Take 325 mg by mouth daily with breakfast., Disp: , Rfl: , 10/14/2021 at 0800  Prenatal 27-0.8 MG Oral Tab, Take 1 tablet by mouth daily. , Disp: , Rfl: , 10/1 complications including PPDH, infection, epidural hematoma, nerve/cord injury. Patient verbalizes understanding of expectations and risk. All questions were answered. Plan for c section.  Explained to patient use of epidural with ga backup Discussed

## 2021-10-15 NOTE — PLAN OF CARE
Problem: SAFETY ADULT - FALL  Goal: Free from fall injury  Description: INTERVENTIONS:  - Assess pt frequently for physical needs  - Identify cognitive and physical deficits and behaviors that affect risk of falls.   - [place] fall precautions as indica

## 2021-10-16 RX ORDER — IBUPROFEN 600 MG/1
600 TABLET ORAL EVERY 6 HOURS
Status: DISCONTINUED | OUTPATIENT
Start: 2021-10-16 | End: 2021-10-17

## 2021-10-16 NOTE — PROGRESS NOTES
Labor Analgesia Follow Up Note    Patient underwent epidural anesthesia for labor analgesia,    Placenta Date/Time: 10/15/2021 12:58 PM    Delivery Date/Time[de-identified] 10/15/2021  12:58 PM    /60 (BP Location: Right arm)   Pulse 75   Temp 97.8 °F (36.6 °C) (

## 2021-10-17 VITALS
WEIGHT: 184 LBS | HEART RATE: 80 BPM | HEIGHT: 66 IN | TEMPERATURE: 98 F | RESPIRATION RATE: 17 BRPM | DIASTOLIC BLOOD PRESSURE: 63 MMHG | BODY MASS INDEX: 29.57 KG/M2 | OXYGEN SATURATION: 100 % | SYSTOLIC BLOOD PRESSURE: 104 MMHG

## 2021-10-17 NOTE — PROGRESS NOTES
Pt is exclusively breastfeeding, assisted with expressing breast milk and reviewed positioning infant for deeper latch skills. Enc following up with the [facility] center for BF support after discharge home.   Pt has a breast pump at home and enc

## 2021-10-17 NOTE — DISCHARGE SUMMARY
[facility]  Discharge Summary    Ryan Alfonso Patient Status:  Inpatient    1995 MRN [id number]   [facility] 2SW-J Attending Debi Jennings MD   [facility] Day # 3 PCP Sumit Hendricks MD     Date of Admission: 10/14/2021    Da

## 2021-10-18 ENCOUNTER — PATIENT OUTREACH (OUTPATIENT)
Dept: CASE MANAGEMENT | Age: 26
End: 2021-10-18

## 2021-10-18 NOTE — PROGRESS NOTES
1st attempt OBGYN Post Partum apt request    Dr Deyanira Gardner [facility], [address]  [phone]    Apts made:  2w Papi Spangler 11/01 @2:30pm - [place]  6w Ceci Hernandez 12/03 @2:00pm - [place]

## 2021-10-20 ENCOUNTER — TELEPHONE (OUTPATIENT)
Dept: OBGYN UNIT | Facility: HOSPITAL | Age: 26
End: 2021-10-20

## 2021-10-30 PROBLEM — O48.0 POST-TERM PREGNANCY, 40-42 WEEKS OF GESTATION (HCC): Status: RESOLVED | Noted: 2021-10-15 | Resolved: 2021-10-30

## 2021-10-30 PROBLEM — O99.820 GBS (GROUP B STREPTOCOCCUS CARRIER), +RV CULTURE, CURRENTLY PREGNANT (HCC): Status: RESOLVED | Noted: 2021-09-20 | Resolved: 2021-10-30

## 2021-10-30 PROBLEM — Z98.891 PREVIOUS CESAREAN SECTION: Status: ACTIVE | Noted: 2021-10-30

## 2021-10-30 PROBLEM — Z34.90 PREGNANCY: Status: RESOLVED | Noted: 2021-10-14 | Resolved: 2021-10-30

## 2021-10-30 PROBLEM — O99.820 GBS (GROUP B STREPTOCOCCUS CARRIER), +RV CULTURE, CURRENTLY PREGNANT: Status: RESOLVED | Noted: 2021-09-20 | Resolved: 2021-10-30

## 2021-10-30 PROBLEM — Z34.90 PREGNANCY (HCC): Status: RESOLVED | Noted: 2021-10-14 | Resolved: 2021-10-30

## 2021-10-30 PROBLEM — O48.0 POST-TERM PREGNANCY, 40-42 WEEKS OF GESTATION: Status: RESOLVED | Noted: 2021-10-15 | Resolved: 2021-10-30

## 2021-11-01 ENCOUNTER — POSTPARTUM (OUTPATIENT)
Dept: OBGYN CLINIC | Facility: CLINIC | Age: 26
End: 2021-11-01

## 2021-11-01 VITALS
WEIGHT: 170.19 LBS | SYSTOLIC BLOOD PRESSURE: 94 MMHG | HEART RATE: 95 BPM | BODY MASS INDEX: 27 KG/M2 | DIASTOLIC BLOOD PRESSURE: 68 MMHG

## 2021-11-01 DIAGNOSIS — Z98.890 POST-OPERATIVE STATE: Primary | ICD-10-CM

## 2021-11-01 PROBLEM — O99.013 ANEMIA DURING PREGNANCY IN THIRD TRIMESTER: Status: RESOLVED | Noted: 2021-06-21 | Resolved: 2021-11-01

## 2021-11-01 PROBLEM — O99.013 ANEMIA DURING PREGNANCY IN THIRD TRIMESTER (HCC): Status: RESOLVED | Noted: 2021-06-21 | Resolved: 2021-11-01

## 2021-11-01 PROCEDURE — 99024 POSTOP FOLLOW-UP VISIT: CPT | Performed by: OBSTETRICS & GYNECOLOGY

## 2021-11-01 PROCEDURE — 3078F DIAST BP <80 MM HG: CPT | Performed by: OBSTETRICS & GYNECOLOGY

## 2021-11-01 PROCEDURE — 3074F SYST BP LT 130 MM HG: CPT | Performed by: OBSTETRICS & GYNECOLOGY

## 2021-11-12 ENCOUNTER — TELEPHONE (OUTPATIENT)
Dept: OBGYN CLINIC | Facility: CLINIC | Age: 26
End: 2021-11-12

## 2021-11-12 NOTE — TELEPHONE ENCOUNTER
Contacted patient. She states that there is an open referral that is preventing insurance from processing something for her son. Referral number provided is for a lactation referral, which is appropriate for it to be open/unused yet.  Clinicals added to

## 2021-11-12 NOTE — TELEPHONE ENCOUNTER
Pt. Called for nurse to finish, with insurance with referral # [id number] .  The insurance co. Called her , thank you

## 2021-11-15 NOTE — TELEPHONE ENCOUNTER
[facility] called here and would like a call back regarding this episode, her name is Soteromari Baldwin at [phone]    Thank you

## 2021-12-02 PROBLEM — Z28.39 RUBELLA NON-IMMUNE STATUS, ANTEPARTUM: Status: RESOLVED | Noted: 2021-05-03 | Resolved: 2021-12-02

## 2021-12-02 PROBLEM — Z34.00 SUPERVISION OF NORMAL FIRST PREGNANCY, ANTEPARTUM: Status: RESOLVED | Noted: 2021-04-08 | Resolved: 2021-12-02

## 2021-12-02 PROBLEM — O09.899 RUBELLA NON-IMMUNE STATUS, ANTEPARTUM (HCC): Status: RESOLVED | Noted: 2021-05-03 | Resolved: 2021-12-02

## 2021-12-02 PROBLEM — Z28.39 RUBELLA NON-IMMUNE STATUS, ANTEPARTUM (HCC): Status: RESOLVED | Noted: 2021-05-03 | Resolved: 2021-12-02

## 2021-12-02 PROBLEM — O99.891 RUBELLA NON-IMMUNE STATUS, ANTEPARTUM: Status: RESOLVED | Noted: 2021-05-03 | Resolved: 2021-12-02

## 2021-12-02 PROBLEM — Z34.00 SUPERVISION OF NORMAL FIRST PREGNANCY, ANTEPARTUM (HCC): Status: RESOLVED | Noted: 2021-04-08 | Resolved: 2021-12-02

## 2021-12-02 PROBLEM — O09.899 RUBELLA NON-IMMUNE STATUS, ANTEPARTUM: Status: RESOLVED | Noted: 2021-05-03 | Resolved: 2021-12-02

## 2021-12-02 PROBLEM — Z28.3 RUBELLA NON-IMMUNE STATUS, ANTEPARTUM: Status: RESOLVED | Noted: 2021-05-03 | Resolved: 2021-12-02

## 2021-12-03 ENCOUNTER — POSTPARTUM (OUTPATIENT)
Dept: OBGYN CLINIC | Facility: CLINIC | Age: 26
End: 2021-12-03

## 2021-12-03 VITALS
BODY MASS INDEX: 26 KG/M2 | HEART RATE: 86 BPM | DIASTOLIC BLOOD PRESSURE: 68 MMHG | SYSTOLIC BLOOD PRESSURE: 100 MMHG | WEIGHT: 162 LBS

## 2021-12-03 PROCEDURE — 3074F SYST BP LT 130 MM HG: CPT | Performed by: OBSTETRICS & GYNECOLOGY

## 2021-12-03 PROCEDURE — 3078F DIAST BP <80 MM HG: CPT | Performed by: OBSTETRICS & GYNECOLOGY

## 2021-12-03 NOTE — PROGRESS NOTES
Subjective:  Patient presents with:  Postpartum Care: CODY Singh presents for her 6 week post partum exam after  section. She denies any gastrointestinal/genitourinary complaints. She denies any symptoms of depression.   Infant doing

## 2022-01-01 NOTE — PROGRESS NOTES
Subjective:  Patient presents with:  Postpartum Care: 2 wk, delivery 10/15/21, epd # 0    Patient presents for 2 week post operative visit from  section. Currently without complaints. Tolerating general diet. Regular bowel movements.   No urinary
Sandra Canchola  (DO)  2022 17:27:47

## 2022-01-14 ENCOUNTER — TELEPHONE (OUTPATIENT)
Dept: OBGYN CLINIC | Facility: CLINIC | Age: 27
End: 2022-01-14

## 2022-01-14 NOTE — TELEPHONE ENCOUNTER
Pt requesting to know if ok to get booster shot while breastfeeding, baby is 1 months old.     Ok to message via [employer]

## 2022-03-28 ENCOUNTER — TELEPHONE (OUTPATIENT)
Dept: FAMILY MEDICINE CLINIC | Facility: CLINIC | Age: 27
End: 2022-03-28

## 2022-03-28 NOTE — TELEPHONE ENCOUNTER
Please enter lab orders for the patient's upcoming physical appointment. Physical scheduled: Your appointments     Date & Time Appointment Department [facility])    Apr 04, 2022  6:15 PM CDT Physical - Established with MD Raya Paulson [address], [address], [address] [place]  ([facility])            Raya 26, [address], [address] Steve Webster [address] [facility] [id number]         Preferred lab: [address] [facility])     The patient has been notified to complete fasting labs prior to their physical appointment.

## 2022-03-29 NOTE — TELEPHONE ENCOUNTER
1. Laboratory examination ordered as part of a routine general medical examination (Primary)  -     Comp Metabolic Panel (14); Future; Expected date: 03/28/2022  -     Lipid Panel; Future; Expected date: 03/28/2022  -     CBC, Platelet; No Differential; Future; Expected date: 03/28/2022  -     TSH W Reflex To Free T4; Future; Expected date: 03/28/2022       OK to notify.  Thanks, Sushma Mendoza MD

## 2022-04-02 ENCOUNTER — LAB ENCOUNTER (OUTPATIENT)
Dept: LAB | Age: 27
End: 2022-04-02
Attending: FAMILY MEDICINE
Payer: COMMERCIAL

## 2022-04-02 DIAGNOSIS — Z00.00 LABORATORY EXAMINATION ORDERED AS PART OF A ROUTINE GENERAL MEDICAL EXAMINATION: ICD-10-CM

## 2022-04-02 LAB
ALBUMIN SERPL-MCNC: 4.1 G/DL (ref 3.4–5)
ALBUMIN/GLOB SERPL: 1.1 {RATIO} (ref 1–2)
ALP LIVER SERPL-CCNC: 114 U/L
ALT SERPL-CCNC: 19 U/L
ANION GAP SERPL CALC-SCNC: 3 MMOL/L (ref 0–18)
AST SERPL-CCNC: 19 U/L (ref 15–37)
BILIRUB SERPL-MCNC: 0.5 MG/DL (ref 0.1–2)
BUN BLD-MCNC: 22 MG/DL (ref 7–18)
CALCIUM BLD-MCNC: 9.5 MG/DL (ref 8.5–10.1)
CHLORIDE SERPL-SCNC: 109 MMOL/L (ref 98–112)
CHOLEST SERPL-MCNC: 149 MG/DL (ref ?–200)
CO2 SERPL-SCNC: 27 MMOL/L (ref 21–32)
CREAT BLD-MCNC: 0.93 MG/DL
ERYTHROCYTE [DISTWIDTH] IN BLOOD BY AUTOMATED COUNT: 13.8 %
FASTING PATIENT LIPID ANSWER: YES
FASTING STATUS PATIENT QL REPORTED: YES
GLOBULIN PLAS-MCNC: 3.7 G/DL (ref 2.8–4.4)
GLUCOSE BLD-MCNC: 72 MG/DL (ref 70–99)
HCT VFR BLD AUTO: 38.4 %
HDLC SERPL-MCNC: 73 MG/DL (ref 40–59)
HGB BLD-MCNC: 12.4 G/DL
LDLC SERPL CALC-MCNC: 66 MG/DL (ref ?–100)
MCH RBC QN AUTO: 29 PG (ref 26–34)
MCHC RBC AUTO-ENTMCNC: 32.3 G/DL (ref 31–37)
MCV RBC AUTO: 89.7 FL
NONHDLC SERPL-MCNC: 76 MG/DL (ref ?–130)
OSMOLALITY SERPL CALC.SUM OF ELEC: 290 MOSM/KG (ref 275–295)
PLATELET # BLD AUTO: 258 10(3)UL (ref 150–450)
POTASSIUM SERPL-SCNC: 4 MMOL/L (ref 3.5–5.1)
PROT SERPL-MCNC: 7.8 G/DL (ref 6.4–8.2)
RBC # BLD AUTO: 4.28 X10(6)UL
SODIUM SERPL-SCNC: 139 MMOL/L (ref 136–145)
TRIGL SERPL-MCNC: 47 MG/DL (ref 30–149)
TSI SER-ACNC: 0.98 MIU/ML (ref 0.36–3.74)
VLDLC SERPL CALC-MCNC: 7 MG/DL (ref 0–30)
WBC # BLD AUTO: 9.1 X10(3) UL (ref 4–11)

## 2022-04-02 PROCEDURE — 80061 LIPID PANEL: CPT

## 2022-04-02 PROCEDURE — 84443 ASSAY THYROID STIM HORMONE: CPT

## 2022-04-02 PROCEDURE — 36415 COLL VENOUS BLD VENIPUNCTURE: CPT

## 2022-04-02 PROCEDURE — 85027 COMPLETE CBC AUTOMATED: CPT

## 2022-04-02 PROCEDURE — 80053 COMPREHEN METABOLIC PANEL: CPT

## 2022-04-04 ENCOUNTER — OFFICE VISIT (OUTPATIENT)
Dept: FAMILY MEDICINE CLINIC | Facility: CLINIC | Age: 27
End: 2022-04-04
Payer: COMMERCIAL

## 2022-04-04 VITALS
SYSTOLIC BLOOD PRESSURE: 110 MMHG | BODY MASS INDEX: 25.39 KG/M2 | WEIGHT: 158 LBS | HEIGHT: 66 IN | DIASTOLIC BLOOD PRESSURE: 62 MMHG

## 2022-04-04 DIAGNOSIS — Z00.00 ANNUAL PHYSICAL EXAM: Primary | ICD-10-CM

## 2022-04-04 PROCEDURE — 3074F SYST BP LT 130 MM HG: CPT | Performed by: FAMILY MEDICINE

## 2022-04-04 PROCEDURE — 86580 TB INTRADERMAL TEST: CPT | Performed by: FAMILY MEDICINE

## 2022-04-04 PROCEDURE — 99395 PREV VISIT EST AGE 18-39: CPT | Performed by: FAMILY MEDICINE

## 2022-04-04 PROCEDURE — 3008F BODY MASS INDEX DOCD: CPT | Performed by: FAMILY MEDICINE

## 2022-04-04 PROCEDURE — 3078F DIAST BP <80 MM HG: CPT | Performed by: FAMILY MEDICINE

## 2022-04-07 ENCOUNTER — NURSE ONLY (OUTPATIENT)
Dept: FAMILY MEDICINE CLINIC | Facility: CLINIC | Age: 27
End: 2022-04-07
Payer: COMMERCIAL

## 2022-04-07 DIAGNOSIS — Z11.1 SCREENING FOR TUBERCULOSIS: Primary | ICD-10-CM

## 2022-04-07 LAB — INDURATION (): 0 MM (ref 0–11)

## 2022-04-07 NOTE — PROGRESS NOTES
Patient was here today for her TB read. She is here at the appropriate time. Test read at 0 mm negative. Form is completed for patient.

## 2022-04-11 ENCOUNTER — NURSE ONLY (OUTPATIENT)
Dept: FAMILY MEDICINE CLINIC | Facility: CLINIC | Age: 27
End: 2022-04-11
Payer: COMMERCIAL

## 2022-04-11 DIAGNOSIS — Z11.1 SCREENING FOR TUBERCULOSIS: Primary | ICD-10-CM

## 2022-04-11 PROCEDURE — 86580 TB INTRADERMAL TEST: CPT | Performed by: FAMILY MEDICINE

## 2022-04-11 NOTE — PROGRESS NOTES
Patient is here for second part of two step TB test. No complaints. The TB test was administered into the right forearm.    Patient will return in 48 to 72 hours to have the test read

## 2022-04-13 ENCOUNTER — NURSE ONLY (OUTPATIENT)
Dept: FAMILY MEDICINE CLINIC | Facility: CLINIC | Age: 27
End: 2022-04-13
Payer: COMMERCIAL

## 2022-04-13 DIAGNOSIS — Z11.1 SCREENING FOR TUBERCULOSIS: Primary | ICD-10-CM

## 2022-04-13 NOTE — PROGRESS NOTES
Patient is here today for her TB read. The TB test was administered on 4/11/22 and read today 4/13/22 at the appropriate time as 0 mm negative.   Patient given copy of immunizations

## 2022-05-03 ENCOUNTER — TELEPHONE (OUTPATIENT)
Dept: OBGYN CLINIC | Facility: CLINIC | Age: 27
End: 2022-05-03

## 2022-05-24 NOTE — TELEPHONE ENCOUNTER
Spoke with patient. Notified patient it is ok to take allergy medicine while breastfeeding (Claritin, Zyrtec, Benadryl). Patient states allergy symptoms are better now but she will consider taking something if symptoms reoccur. Questions answered and patient verbalizes understanding.

## 2023-03-14 ENCOUNTER — TELEPHONE (OUTPATIENT)
Dept: FAMILY MEDICINE CLINIC | Facility: CLINIC | Age: 28
End: 2023-03-14

## 2023-05-02 ENCOUNTER — TELEPHONE (OUTPATIENT)
Dept: FAMILY MEDICINE CLINIC | Facility: CLINIC | Age: 28
End: 2023-05-02

## 2023-05-02 DIAGNOSIS — Z00.00 LABORATORY EXAMINATION ORDERED AS PART OF A ROUTINE GENERAL MEDICAL EXAMINATION: Primary | ICD-10-CM

## 2023-05-02 NOTE — TELEPHONE ENCOUNTER
1. Laboratory examination ordered as part of a routine general medical examination (Primary)  -     Comp Metabolic Panel (14); Future; Expected date: 05/02/2023  -     Lipid Panel; Future; Expected date: 05/02/2023  -     CBC, Platelet; No Differential; Future; Expected date: 05/02/2023  -     TSH W Reflex To Free T4; Future; Expected date: 05/02/2023       OK to notify.  Thanks, Shaji Hanna MD

## 2023-05-02 NOTE — TELEPHONE ENCOUNTER
Please enter lab orders for the patient's upcoming physical appointment. Physical scheduled: Your appointments     Date & Time Appointment Department [facility])    May 12, 2023  9:30 AM CDT Physical - Established with Inocencia Lopez MD [employer], [address], [place] ([address])            [facility], [address], [place] [address] [facility] [id number]         Preferred lab: [address] [facility])     The patient has been notified to complete fasting labs prior to their physical appointment.

## 2023-05-15 ENCOUNTER — TELEPHONE (OUTPATIENT)
Dept: FAMILY MEDICINE CLINIC | Facility: CLINIC | Age: 28
End: 2023-05-15

## 2023-05-15 NOTE — TELEPHONE ENCOUNTER
Pt Is calling because she would like to get the $40 no show fee removed because she wasn't notified of the appointment and didn't know about it

## 2023-06-15 ENCOUNTER — LAB ENCOUNTER (OUTPATIENT)
Dept: LAB | Age: 28
End: 2023-06-15
Attending: FAMILY MEDICINE
Payer: COMMERCIAL

## 2023-06-15 DIAGNOSIS — Z00.00 LABORATORY EXAMINATION ORDERED AS PART OF A ROUTINE GENERAL MEDICAL EXAMINATION: ICD-10-CM

## 2023-06-15 LAB
ALBUMIN SERPL-MCNC: 3.8 G/DL (ref 3.4–5)
ALBUMIN/GLOB SERPL: 0.9 {RATIO} (ref 1–2)
ALP LIVER SERPL-CCNC: 75 U/L
ALT SERPL-CCNC: 16 U/L
ANION GAP SERPL CALC-SCNC: 5 MMOL/L (ref 0–18)
AST SERPL-CCNC: 17 U/L (ref 15–37)
BILIRUB SERPL-MCNC: 0.5 MG/DL (ref 0.1–2)
BUN BLD-MCNC: 19 MG/DL (ref 7–18)
CALCIUM BLD-MCNC: 8.9 MG/DL (ref 8.5–10.1)
CHLORIDE SERPL-SCNC: 107 MMOL/L (ref 98–112)
CHOLEST SERPL-MCNC: 143 MG/DL (ref ?–200)
CO2 SERPL-SCNC: 27 MMOL/L (ref 21–32)
CREAT BLD-MCNC: 0.92 MG/DL
ERYTHROCYTE [DISTWIDTH] IN BLOOD BY AUTOMATED COUNT: 13.6 %
FASTING PATIENT LIPID ANSWER: YES
FASTING STATUS PATIENT QL REPORTED: YES
GFR SERPLBLD BASED ON 1.73 SQ M-ARVRAT: 87 ML/MIN/1.73M2 (ref 60–?)
GLOBULIN PLAS-MCNC: 4.4 G/DL (ref 2.8–4.4)
GLUCOSE BLD-MCNC: 85 MG/DL (ref 70–99)
HCT VFR BLD AUTO: 39.4 %
HDLC SERPL-MCNC: 72 MG/DL (ref 40–59)
HGB BLD-MCNC: 12.6 G/DL
LDLC SERPL CALC-MCNC: 62 MG/DL (ref ?–100)
MCH RBC QN AUTO: 28.1 PG (ref 26–34)
MCHC RBC AUTO-ENTMCNC: 32 G/DL (ref 31–37)
MCV RBC AUTO: 87.9 FL
NONHDLC SERPL-MCNC: 71 MG/DL (ref ?–130)
OSMOLALITY SERPL CALC.SUM OF ELEC: 290 MOSM/KG (ref 275–295)
PLATELET # BLD AUTO: 269 10(3)UL (ref 150–450)
POTASSIUM SERPL-SCNC: 3.8 MMOL/L (ref 3.5–5.1)
PROT SERPL-MCNC: 8.2 G/DL (ref 6.4–8.2)
RBC # BLD AUTO: 4.48 X10(6)UL
SODIUM SERPL-SCNC: 139 MMOL/L (ref 136–145)
TRIGL SERPL-MCNC: 37 MG/DL (ref 30–149)
TSI SER-ACNC: 0.74 MIU/ML (ref 0.36–3.74)
VLDLC SERPL CALC-MCNC: 5 MG/DL (ref 0–30)
WBC # BLD AUTO: 7.6 X10(3) UL (ref 4–11)

## 2023-06-15 PROCEDURE — 84443 ASSAY THYROID STIM HORMONE: CPT

## 2023-06-15 PROCEDURE — 36415 COLL VENOUS BLD VENIPUNCTURE: CPT

## 2023-06-15 PROCEDURE — 85027 COMPLETE CBC AUTOMATED: CPT

## 2023-06-15 PROCEDURE — 80053 COMPREHEN METABOLIC PANEL: CPT

## 2023-06-15 PROCEDURE — 80061 LIPID PANEL: CPT

## 2023-06-16 ENCOUNTER — OFFICE VISIT (OUTPATIENT)
Dept: FAMILY MEDICINE CLINIC | Facility: CLINIC | Age: 28
End: 2023-06-16
Payer: COMMERCIAL

## 2023-06-16 VITALS
HEART RATE: 76 BPM | WEIGHT: 144.38 LBS | RESPIRATION RATE: 16 BRPM | HEIGHT: 66 IN | BODY MASS INDEX: 23.2 KG/M2 | DIASTOLIC BLOOD PRESSURE: 68 MMHG | SYSTOLIC BLOOD PRESSURE: 108 MMHG

## 2023-06-16 DIAGNOSIS — Z00.00 ANNUAL PHYSICAL EXAM: Primary | ICD-10-CM

## 2023-06-16 DIAGNOSIS — D22.9 CHANGE IN MOLE: ICD-10-CM

## 2023-06-16 PROCEDURE — 3078F DIAST BP <80 MM HG: CPT | Performed by: FAMILY MEDICINE

## 2023-06-16 PROCEDURE — 3008F BODY MASS INDEX DOCD: CPT | Performed by: FAMILY MEDICINE

## 2023-06-16 PROCEDURE — 3074F SYST BP LT 130 MM HG: CPT | Performed by: FAMILY MEDICINE

## 2023-06-16 PROCEDURE — 99395 PREV VISIT EST AGE 18-39: CPT | Performed by: FAMILY MEDICINE

## 2023-09-15 ENCOUNTER — TELEPHONE (OUTPATIENT)
Dept: OBGYN CLINIC | Facility: CLINIC | Age: 28
End: 2023-09-15

## 2023-09-15 NOTE — TELEPHONE ENCOUNTER
Patient calling to initiate prenatal care  LMP 8-  Patient is 7-8 weeks on 10-10-23  Confirmation Ultrasound and Appointment scheduled on   Future Appointments   Date Time Provider Patrick Stephanie   10/18/2023  1:45 PM [facility] US PLFD [facility] [address]   10/18/2023  2:15 PM Yan Pressley MD [facility] [address]   11/21/2023 12:30 PM Yan Pressley MD [facility] [address]        Any history of ectopic pregnancy? no  Any history of miscarriage? no  Any medications that you are taking on a regular basis other than prenatal vitamins?  No (if not taking prenatal vitamins, encourage patient to start taking.)  Any bleeding since the first day of last LMP and your positive pregnancy test? no    Alexandru [facility] [place]

## 2023-09-25 ENCOUNTER — TELEPHONE (OUTPATIENT)
Dept: OBGYN CLINIC | Facility: CLINIC | Age: 28
End: 2023-09-25

## 2023-09-25 NOTE — TELEPHONE ENCOUNTER
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  Should like to know if she should be concerned  Future Appointments   Date Time Provider Patrick Brown   10/10/2023 12:45 PM [facility] OB US PLFD [facility] [address]   10/10/2023  1:15 PM Anival Arnett MD [facility] [address]   11/7/2023 12:30 PM Fernando Carrera MD [facility] [address]   Thank you

## 2023-10-10 ENCOUNTER — OFFICE VISIT (OUTPATIENT)
Dept: OBGYN CLINIC | Facility: CLINIC | Age: 28
End: 2023-10-10
Payer: COMMERCIAL

## 2023-10-10 ENCOUNTER — ULTRASOUND ENCOUNTER (OUTPATIENT)
Dept: OBGYN CLINIC | Facility: CLINIC | Age: 28
End: 2023-10-10
Payer: COMMERCIAL

## 2023-10-10 VITALS
HEIGHT: 66 IN | BODY MASS INDEX: 23.7 KG/M2 | HEART RATE: 99 BPM | SYSTOLIC BLOOD PRESSURE: 124 MMHG | DIASTOLIC BLOOD PRESSURE: 76 MMHG | WEIGHT: 147.5 LBS

## 2023-10-10 DIAGNOSIS — Z32.01 PREGNANCY EXAMINATION OR TEST, POSITIVE RESULT: ICD-10-CM

## 2023-10-10 DIAGNOSIS — N91.1 SECONDARY AMENORRHEA: Primary | ICD-10-CM

## 2023-10-10 PROBLEM — Z98.891 HISTORY OF CESAREAN DELIVERY: Status: ACTIVE | Noted: 2021-10-30

## 2023-10-10 PROBLEM — Z87.59 HISTORY OF PRIOR PREGNANCY WITH SMALL FOR GESTATIONAL AGE NEWBORN: Status: ACTIVE | Noted: 2023-10-10

## 2023-10-10 PROCEDURE — 3008F BODY MASS INDEX DOCD: CPT | Performed by: OBSTETRICS & GYNECOLOGY

## 2023-10-10 PROCEDURE — 76830 TRANSVAGINAL US NON-OB: CPT | Performed by: OBSTETRICS & GYNECOLOGY

## 2023-10-10 PROCEDURE — 3078F DIAST BP <80 MM HG: CPT | Performed by: OBSTETRICS & GYNECOLOGY

## 2023-10-10 PROCEDURE — 99214 OFFICE O/P EST MOD 30 MIN: CPT | Performed by: OBSTETRICS & GYNECOLOGY

## 2023-10-10 PROCEDURE — 3074F SYST BP LT 130 MM HG: CPT | Performed by: OBSTETRICS & GYNECOLOGY

## 2023-10-10 RX ORDER — CHOLECALCIFEROL (VITAMIN D3) 25 MCG
1 TABLET,CHEWABLE ORAL DAILY
COMMUNITY

## 2023-11-07 ENCOUNTER — LAB ENCOUNTER (OUTPATIENT)
Dept: LAB | Age: 28
End: 2023-11-07
Attending: OBSTETRICS & GYNECOLOGY
Payer: COMMERCIAL

## 2023-11-07 ENCOUNTER — INITIAL PRENATAL (OUTPATIENT)
Dept: OBGYN CLINIC | Facility: CLINIC | Age: 28
End: 2023-11-07
Payer: COMMERCIAL

## 2023-11-07 VITALS
HEART RATE: 80 BPM | DIASTOLIC BLOOD PRESSURE: 60 MMHG | BODY MASS INDEX: 24.45 KG/M2 | WEIGHT: 152.13 LBS | SYSTOLIC BLOOD PRESSURE: 120 MMHG | HEIGHT: 66 IN

## 2023-11-07 DIAGNOSIS — Z36.9 ANTENATAL SCREENING ENCOUNTER: ICD-10-CM

## 2023-11-07 DIAGNOSIS — Z34.90 PRENATAL CARE, ANTEPARTUM: Primary | ICD-10-CM

## 2023-11-07 LAB
ANTIBODY SCREEN: NEGATIVE
BASOPHILS # BLD AUTO: 0.04 X10(3) UL (ref 0–0.2)
BASOPHILS NFR BLD AUTO: 0.3 %
EOSINOPHIL # BLD AUTO: 0.12 X10(3) UL (ref 0–0.7)
EOSINOPHIL NFR BLD AUTO: 0.8 %
ERYTHROCYTE [DISTWIDTH] IN BLOOD BY AUTOMATED COUNT: 13.8 %
HBV SURFACE AG SER-ACNC: <0.1 [IU]/L
HBV SURFACE AG SERPL QL IA: NONREACTIVE
HCT VFR BLD AUTO: 36 %
HCV AB SERPL QL IA: NONREACTIVE
HGB BLD-MCNC: 12.2 G/DL
IMM GRANULOCYTES # BLD AUTO: 0.07 X10(3) UL (ref 0–1)
IMM GRANULOCYTES NFR BLD: 0.5 %
LYMPHOCYTES # BLD AUTO: 3.13 X10(3) UL (ref 1–4)
LYMPHOCYTES NFR BLD AUTO: 21.8 %
MCH RBC QN AUTO: 29.3 PG (ref 26–34)
MCHC RBC AUTO-ENTMCNC: 33.9 G/DL (ref 31–37)
MCV RBC AUTO: 86.3 FL
MONOCYTES # BLD AUTO: 0.91 X10(3) UL (ref 0.1–1)
MONOCYTES NFR BLD AUTO: 6.3 %
NEUTROPHILS # BLD AUTO: 10.1 X10 (3) UL (ref 1.5–7.7)
NEUTROPHILS # BLD AUTO: 10.1 X10(3) UL (ref 1.5–7.7)
NEUTROPHILS NFR BLD AUTO: 70.3 %
PLATELET # BLD AUTO: 246 10(3)UL (ref 150–450)
RBC # BLD AUTO: 4.17 X10(6)UL
RH BLOOD TYPE: POSITIVE
RUBV IGG SER QL: POSITIVE
RUBV IGG SER-ACNC: 37.9 IU/ML (ref 10–?)
T PALLIDUM AB SER QL IA: NONREACTIVE
WBC # BLD AUTO: 14.4 X10(3) UL (ref 4–11)

## 2023-11-07 PROCEDURE — 36415 COLL VENOUS BLD VENIPUNCTURE: CPT

## 2023-11-07 PROCEDURE — 87086 URINE CULTURE/COLONY COUNT: CPT | Performed by: OBSTETRICS & GYNECOLOGY

## 2023-11-07 PROCEDURE — 83021 HEMOGLOBIN CHROMOTOGRAPHY: CPT

## 2023-11-07 PROCEDURE — 83020 HEMOGLOBIN ELECTROPHORESIS: CPT

## 2023-11-07 PROCEDURE — 3078F DIAST BP <80 MM HG: CPT | Performed by: OBSTETRICS & GYNECOLOGY

## 2023-11-07 PROCEDURE — 3008F BODY MASS INDEX DOCD: CPT | Performed by: OBSTETRICS & GYNECOLOGY

## 2023-11-07 PROCEDURE — 86803 HEPATITIS C AB TEST: CPT

## 2023-11-07 PROCEDURE — 86901 BLOOD TYPING SEROLOGIC RH(D): CPT

## 2023-11-07 PROCEDURE — 87077 CULTURE AEROBIC IDENTIFY: CPT | Performed by: OBSTETRICS & GYNECOLOGY

## 2023-11-07 PROCEDURE — 86780 TREPONEMA PALLIDUM: CPT

## 2023-11-07 PROCEDURE — 3074F SYST BP LT 130 MM HG: CPT | Performed by: OBSTETRICS & GYNECOLOGY

## 2023-11-07 PROCEDURE — 86762 RUBELLA ANTIBODY: CPT

## 2023-11-07 PROCEDURE — 87389 HIV-1 AG W/HIV-1&-2 AB AG IA: CPT

## 2023-11-07 PROCEDURE — 86850 RBC ANTIBODY SCREEN: CPT

## 2023-11-07 PROCEDURE — 86900 BLOOD TYPING SEROLOGIC ABO: CPT

## 2023-11-07 PROCEDURE — 85025 COMPLETE CBC W/AUTO DIFF WBC: CPT

## 2023-11-07 PROCEDURE — 87340 HEPATITIS B SURFACE AG IA: CPT

## 2023-11-08 LAB
HGB A2 MFR BLD: 1.6 % (ref 1.5–3.5)
HGB F MFR BLD: 0.3 % (ref 0–2)
HGB PNL BLD ELPH: 98.1 % (ref 95.5–100)

## 2023-11-09 ENCOUNTER — TELEPHONE (OUTPATIENT)
Dept: OBGYN CLINIC | Facility: CLINIC | Age: 28
End: 2023-11-09

## 2023-11-09 DIAGNOSIS — Z36.9 ANTENATAL SCREENING ENCOUNTER: Primary | ICD-10-CM

## 2023-11-09 PROBLEM — R82.71 BACTERIURIA DURING PREGNANCY: Status: ACTIVE | Noted: 2023-11-09

## 2023-11-09 PROBLEM — O99.891 BACTERIURIA DURING PREGNANCY: Status: ACTIVE | Noted: 2023-11-09

## 2023-11-09 PROBLEM — O99.891 BACTERIURIA DURING PREGNANCY (HCC): Status: ACTIVE | Noted: 2023-11-09

## 2023-11-09 PROBLEM — R82.71 BACTERIURIA DURING PREGNANCY (HCC): Status: ACTIVE | Noted: 2023-11-09

## 2023-11-09 NOTE — TELEPHONE ENCOUNTER
13w1d  Spoke to patient. Patient reports concern with the urine culture result- understands that it is likely just contamination but does not feel comfortable waiting until her next appointment to have the test repeated. 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 Urine culture ordered for patient to complete in the lab at her convenience.

## 2023-12-05 ENCOUNTER — ROUTINE PRENATAL (OUTPATIENT)
Dept: OBGYN CLINIC | Facility: CLINIC | Age: 28
End: 2023-12-05
Payer: COMMERCIAL

## 2023-12-05 VITALS
HEART RATE: 85 BPM | DIASTOLIC BLOOD PRESSURE: 60 MMHG | SYSTOLIC BLOOD PRESSURE: 110 MMHG | BODY MASS INDEX: 25.23 KG/M2 | HEIGHT: 66 IN | WEIGHT: 157 LBS

## 2023-12-05 DIAGNOSIS — Z87.59 HISTORY OF PRIOR PREGNANCY WITH SMALL FOR GESTATIONAL AGE NEWBORN: ICD-10-CM

## 2023-12-05 DIAGNOSIS — Z98.891 HISTORY OF CESAREAN DELIVERY: ICD-10-CM

## 2023-12-05 DIAGNOSIS — Z34.80 SUPERVISION OF OTHER NORMAL PREGNANCY, ANTEPARTUM: Primary | ICD-10-CM

## 2023-12-05 PROCEDURE — 3078F DIAST BP <80 MM HG: CPT | Performed by: OBSTETRICS & GYNECOLOGY

## 2023-12-05 PROCEDURE — 3074F SYST BP LT 130 MM HG: CPT | Performed by: OBSTETRICS & GYNECOLOGY

## 2023-12-05 PROCEDURE — 3008F BODY MASS INDEX DOCD: CPT | Performed by: OBSTETRICS & GYNECOLOGY

## 2023-12-05 NOTE — PROGRESS NOTES
FRANCIA    GA 16w6d  Vitals:    23 1033   BP: 110/60   Pulse: 85   Weight: 157 lb (71.2 kg)   Height: 66\"       Doing well. No complaints. Denies abdominal/pelvic pain or vaginal bleeding. Rh +   Genetic screening declined. Recommend Anatomy scan 20 wks   Prenatal labs reviewed     Problem List Items Addressed This Visit          Gravid and     History of  delivery    History of prior pregnancy with small for gestational age     Supervision of other normal pregnancy, antepartum - Primary         RTC in 4 weeks           Note to patient and family   The [facility] makes medical notes available to patients in the interest of transparency. However, please be advised that this is a medical document. It is intended as ajmy-ez-lhnr communication. It is written and medical language may contain abbreviations or verbiage that are technical and unfamiliar. It may appear blunt or direct. Medical documents are intended to carry relevant information, facts as evident, and the clinical opinion of the practitioner.

## 2024-01-11 ENCOUNTER — LAB ENCOUNTER (OUTPATIENT)
Dept: LAB | Age: 29
End: 2024-01-11
Attending: OBSTETRICS & GYNECOLOGY
Payer: COMMERCIAL

## 2024-01-11 ENCOUNTER — ULTRASOUND ENCOUNTER (OUTPATIENT)
Dept: OBGYN CLINIC | Facility: CLINIC | Age: 29
End: 2024-01-11
Payer: COMMERCIAL

## 2024-01-11 ENCOUNTER — ROUTINE PRENATAL (OUTPATIENT)
Dept: OBGYN CLINIC | Facility: CLINIC | Age: 29
End: 2024-01-11
Payer: COMMERCIAL

## 2024-01-11 VITALS
BODY MASS INDEX: 27 KG/M2 | HEART RATE: 87 BPM | DIASTOLIC BLOOD PRESSURE: 68 MMHG | SYSTOLIC BLOOD PRESSURE: 112 MMHG | WEIGHT: 166 LBS

## 2024-01-11 DIAGNOSIS — Z87.59 HISTORY OF PRIOR PREGNANCY WITH SMALL FOR GESTATIONAL AGE NEWBORN: ICD-10-CM

## 2024-01-11 DIAGNOSIS — Z34.80 SUPERVISION OF OTHER NORMAL PREGNANCY, ANTEPARTUM: Primary | ICD-10-CM

## 2024-01-11 DIAGNOSIS — O28.3 ABNORMAL FETAL ULTRASOUND: ICD-10-CM

## 2024-01-11 DIAGNOSIS — Z34.80 SUPERVISION OF OTHER NORMAL PREGNANCY, ANTEPARTUM: ICD-10-CM

## 2024-01-11 DIAGNOSIS — Z13.79 GENETIC SCREENING: ICD-10-CM

## 2024-01-11 DIAGNOSIS — Z98.891 HISTORY OF CESAREAN DELIVERY: ICD-10-CM

## 2024-01-11 DIAGNOSIS — Z34.90 ENCOUNTER FOR SUPERVISION OF NORMAL PREGNANCY, ANTEPARTUM, UNSPECIFIED GRAVIDITY: ICD-10-CM

## 2024-01-11 DIAGNOSIS — O35.03X0 CHOROID PLEXUS CYST OF FETUS AFFECTING CARE OF MOTHER, ANTEPARTUM, SINGLE OR UNSPECIFIED FETUS: ICD-10-CM

## 2024-01-11 DIAGNOSIS — O99.891 BACTERIURIA DURING PREGNANCY: ICD-10-CM

## 2024-01-11 DIAGNOSIS — R82.71 BACTERIURIA DURING PREGNANCY: ICD-10-CM

## 2024-01-11 PROCEDURE — 87086 URINE CULTURE/COLONY COUNT: CPT | Performed by: OBSTETRICS & GYNECOLOGY

## 2024-01-11 PROCEDURE — 76805 OB US >/= 14 WKS SNGL FETUS: CPT | Performed by: OBSTETRICS & GYNECOLOGY

## 2024-01-11 PROCEDURE — 3074F SYST BP LT 130 MM HG: CPT | Performed by: OBSTETRICS & GYNECOLOGY

## 2024-01-11 PROCEDURE — 3078F DIAST BP <80 MM HG: CPT | Performed by: OBSTETRICS & GYNECOLOGY

## 2024-01-11 NOTE — PATIENT INSTRUCTIONS
[id number]- Optional for all insurance plans except [facility].  Please call [facility] at [phone] to discuss billing, prior authorizations, or referrals  CHOROID PLEXUS CYSTS   ?Prenatal diagnosis - The choroid plexuses start developing at approximately six to seven weeks of gestation, grow rapidly, and fill approximately 75 percent of the cavity of the lateral ventricles by 9 weeks of gestation. The adult appearance is reached by 20 weeks of gestation [112,113].  Choroid plexus cysts appear to result from filling of the neuroepithelial folds with cerebrospinal fluid [114]. The typical sonographic appearance is a small (usually less than 1 cm), anechoic structure(s) with well delineated borders located within the choroid plexus (image 18). A wide range of appearances are possible, from unilateral single cysts to bilateral septated and multiple cysts (image 19) [112,115-120].  The choroid plexus can be heterogenous with a few small cystic areas. These will not have well-delineated borders and will be best seen on a single plane. Therefore, since the finding of choroid plexus cyst greatly increases patient anxiety, in addition to the routine anatomic survey, we assess the hands, feet, heart, and brain for additional findings. If none are present, we might describe choroid plexus heterogeneity without a true choroid plexus cyst. Alternatively, if the cyst is at least 2 cm and is seen in two orthogonal planes, it is a real finding and indicates that a formal fetal high-risk anatomic survey be performed.  ?Clinical significance - Choroid plexus cysts are common sonographic findings during the second trimester. In the absence of other CNS or extra-CNS anomalies and risk factors for chromosomal aneuploidy, isolated choroid plexus cysts are considered to be a variant of normal regardless of shape, size, or laterality. They usually disappear by the third trimester; those that persist are usually asymptomatic, and thus  benign.  Isolated choroid plexus cysts are present in 1 to 2 percent of the normal population and not associated with an increased risk of aneuploidy. Fetuses with additional anomalies (non-isolated choroid plexus cysts) are at increased risk of chromosomal abnormalities, especially trisomy 18. Thus, a thorough fetal anatomic survey is important when these cysts are detected to  the patient about the need for further evaluation, either noninvasively with cell-free DNA or diagnostically with an amniocentesis. (See \"Sonographic findings associated with fetal aneuploidy\", section on 'Choroid plexus cysts'.)  A systematic review of a few small studies of children with a history of an isolated choroid plexus cyst followed into adolescence found no association with adverse health or neurodevelopmental outcomes [121]. However, long-term prognosis remains unclear given the small number of cases, high risk of selection bias, unclear and varied definitions, and lack of a comparison group in these studies.        Gestational Diabetes Screening Instructions     This is a routine test done during pregnancy during the 24-28th week of gestation. The purpose of this test is to determine if your body is able to process sugar and to see if you have developed gestational diabetes.   Please follow the instructions below very carefully so you will not have to repeat the test.     1.  ____x__ One Hour Glucose Test--Do not eat anything two hours before   your scheduled test. Plan to arrive at the office at least 15 minutes before   your scheduled appointment. Please notify the  that you are   having the sugar test done. This test may not be scheduled in the last   hour of our business.       ______ Three Hour Glucose Test--You may not have food 8 hours   before  your scheduled test. Drinking water is allowed during these 8   hours. Plan to  arrive at the office at least 15 minutes before your    scheduled appointment.  Please notify the  that you are   having the sugar test done. To make this easiest on yourself, this    test should be scheduled during the morning hours of our business.     2. You will be given a chilled glucola drink. You must drink the entire    contents of the bottle within 5 minutes. The time you finish the drink must   be marked down.     3. After starting the test, you will not be allowed to leave the office or have   anything to eat or drink. This includes all foods, beverages, candy, mints,   gum, etc.     4.  Your blood will be drawn in the office lab, exactly sixty minutes after you   have finished the glucola drink.     5. Your provider/staff nurse will notify you of your results when they come   back.        Medications Safe in Pregnancy  The following over-the-counter medications may be taken safely after 12 weeks gestation by any patient who is pregnant.  Please follow the instructions on the package for adult dosage.  If you experience any symptoms prior to 12 weeks, please call the office at [phone].      Colds/Congestion: Flonase, Saline Nasal Spray, Neti Pot, Plain Robitussin, Robitussin DM, Mucinex DM, Vicks 44 E, Vicks Vapor rub, Cough drops without Zinc or Sudafed.   Contact your doctor if you have a persistent fever over 100.4, shortness of breath, coughing up green mucus, or a sore throat that persists from more than 3 days    Diarrhea: Imodium, Maalox Anti-Diarrheal or Kaopectate  Contact the office if you have diarrhea for more than 3 days.    Allergies: Benadryl, Claritin or Zyrtec    Hemorrhoids: Tucks pads, Preparation H, Annusol, Sitz bath or Witch hazel  Eat a high fiber diet and drink plenty of fluids.    Yeast: Monistat 3 or 7  Call if your symptoms do not improve, or if you experience vaginal bleeding, or a watery discharge.    Constipation: Metamucil, Colace, fiber supplement, Milk of Magnesia or Dulcolax  Drink plenty of fluids, eat high-fiber foods and take the  above laxatives sporadically.     Headache or Mild aches and pain: Extra Strength Tylenol     Gas: Gas X, Gelusil, Papaya Tablets, Maalox, Mylicon or Mylanta Gas    Heartburn: Tums, Mylanta, Pepcid AC or Maalox    Sore throat: Alcohol free Chloraseptic spray or Lozenges     Nausea and Vomiting: ½ tablet Unisom plus 100mg of Vitamin B6 at bedtime (may increase B6 up to 200mg per day), Razia root tea or raspberry leaf tea    Insomnia: Tylenol PM, Vitamin B6 50mg, warm bath or milk, Unisom, Nytol or Sominex.     We have listed a few medications for common symptoms seen in pregnancy.  Please contact the office if you are unsure about any over the counter medications that are not listed above.

## 2024-01-11 NOTE — PROGRESS NOTES
FRANCIA    GA: 22w1d  Vitals:    24 1138   BP: 112/68   Pulse: 87   Weight: 166 lb (75.3 kg)       Doing well  No complaints. Denies LOF/VB/uctx  RH +  Genetic testing declined.  However, after ultrasound findings today patient desires to proceed with NIPT.  New order for maternity 21 with instructions provided.  Anatomy Scan  unremarkable except limited face views and bilateral choroid plexus cyst.  Discussed with patient, refer to [facility].  Information regarding choroid plexus cyst provided and encouraged to review.  Patient understandably upset and concerned about ultrasound findings.  Reassured patient that [facility] ultrasound will provide additional information and follow-up.  CBC and 1 hr GTT order and instructions provided      Assessment:     Bryanna Kelsey is a 28 year old  at 22w1d who presents for routine OB visit. Overall doing well.     Problem List Items Addressed This Visit          Gravid and     History of  delivery    History of prior pregnancy with small for gestational age     Relevant Orders    Maternal Fetal Medicine Referral - [facility]    Bacteriuria during pregnancy    Relevant Orders    Culture Urine Routine    Supervision of other normal pregnancy, antepartum - Primary    Relevant Orders    US OB 2nd Trimester Complete >14 wks [79891] (Completed)    [id number]    Choroid plexus cysts, fetal, affecting care of mother, antepartum    Relevant Orders    Maternal Fetal Medicine Referral - [facility] Location    [id number]    Abnormal fetal ultrasound    Relevant Orders    Maternal Fetal Medicine Referral - [facility]    Encounter for supervision of normal pregnancy, antepartum    Relevant Orders    US OB 2nd Trimester Complete >14 wks [[id number]] (Completed)    [id number]    CBC (with DIFF, Platelet) Reflex to Ferritin    Glucose 1 HR OB     Other Visit Diagnoses       Genetic screening        Relevant Orders    [id number]                Plan:      Patient Active Problem List    Diagnosis    Choroid plexus cysts, fetal, affecting care of mother, antepartum     bilateral rigtht 0.58 cm and left 0.40 cm   [ ] MFM      Abnormal fetal ultrasound     limited visualization of nose, lips and profile  [ ] MFM      Encounter for supervision of normal pregnancy, antepartum    Supervision of other normal pregnancy, antepartum    Bacteriuria during pregnancy     < 10x6 of staph and strep.    [  ] recheck urine culture at next visit      History of prior pregnancy with small for gestational age      [id number] at 41 wks   [ ] growth scan at 32 weeks      History of  delivery     PLTCS 2/2 Fetal intolerance to labor 10/15/21.  Considering TOLAC with scheduled CS around due date if no labor  [ ] considering TOLAC          - continue routine prenatal care   - labor and rupture of membrane precautions provided    Return to clinic in 4 weeks for FRANCIA visit         Note to patient and family   The  [employer] Cures Act makes medical notes available to patients in the interest of transparency.  However, please be advised that this is a medical document.  It is intended as momb-ir-frhf communication.  It is written and medical language may contain abbreviations or verbiage that are technical and unfamiliar.  It may appear blunt or direct.  Medical documents are intended to carry relevant information, facts as evident, and the clinical opinion of the practitioner.      Kyara Armstrong MD   [facility] - OBGYN

## 2024-01-11 NOTE — PROGRESS NOTES
Outpatient Maternal-Fetal Medicine Consultation    Dear Dr. Armstrong    Thank you for requesting ultrasound evaluation and maternal fetal medicine consultation on your patient Bryanna Kelsey.  As you are aware she is a 28 year old female  with a singletonpregnancy and an Estimated Date of Delivery: 5/15/24.  A maternal-fetal medicine consultation was requested secondary to  [facility] on outside u/s .  Her prenatal records and labs were reviewed.    HISTORY  # 1 - Date: 10/15/21, Sex: Male, Weight: 6 lb 5.9 oz (2.89 kg), GA: 41w0d, Delivery: Caesarean Section, Apgar1: 8, Apgar5: 9, Living: Living, Birth Comments: None    # 2 - Date: None, Sex: None, Weight: None, GA: None, Delivery: None, Apgar1: None, Apgar5: None, Living: None, Birth Comments: None      Past Medical History  The patient  has no past medical history on file.    Past Surgical History  The patient  has no past surgical history on file.    Family History  The patient She indicated that her mother is alive. She indicated that her father is .      Medications:   Current Outpatient Medications:     prenatal vitamin with DHA 27-0.8-228 MG Oral Cap, Take 1 capsule by mouth daily., Disp: , Rfl:   Allergies: No Known Allergies    PHYSICAL EXAMINATION:  LMP 09/15/2023 (Exact Date)   General: alert and oriented,no acute distress  Abdomen: gravid, soft, non-tender  Extremities: non-tender, no edema    OBSTETRIC ULTRASOUND    The patient had a level II ultrasound today which revealed size consistent with dates with choroid plexus cyst(s) but an otherwise normal detailed anatomic survey.    Ultrasound Findings:  Single IUP in cephalic presentation.    Placenta is anterior.   A 3 vessel cord is noted.  Cardiac activity is present at 148 bpm   g ( 1 lb 4 oz);    MVP is 4.4 cm .     A 5 mm choroid plexus cyst was visualized during today's game.     The fetal measurements are consistent with the established EDC. No ultrasound evidence of structural  abnormalities are seen today. The nasal bone is present. No ultrasound evidence of markers for aneuploidy are seen. She understands that ultrasound exam cannot exclude genetic abnormalities and that genetic testing is recommended. The limitations of ultrasound were discussed.    See PACS/Imaging Tab For Complete Ultrasound Report  I interpreted the results and reviewed them with the patient.    DISCUSSION  During her visit we discussed and reviewed the following issues:  CHOROID PLEXUS CYSTS  The choroid plexus is a structure that lines the wall of the lateral ventricles and is responsible for the production of cerebrospinal fluid.  Choroid plexus cysts (CPC) consist of columnar and cuboidal epithelium and an underlying network of blood vessels that project from the medial wall of the lateral ventricle.  The incidence of CPC's in the general population is about 1-2%.  CPC's have been associated with fetal aneuploidy, specifically, trisomy 18.  However, in fetuses with trisomy 18 other significant structural anomalies are invariably present.  Hence, in the absence of other structural anomalies the trisomy 18 risk is not felt to be elevated. In most studies, the risk of aneuploidy with an isolated CPC is similar to the patient's age-related aneuploidy risk.  The presence of bilateral or large CPC's does not change the risk.  If other abnormal sonographic findings are present genetic testing is offered..       IMPRESSION:  IUP at 22w6d  Choroid Plexus Cyst - low-risk NIPT    RECOMMENDATIONS:  Continue care with Dr. Armstrong  F/U ultrasound at 32 weeks    Thank you for allowing me to participate in the care of your patient.  Please do not hesitate to contact me if additional questions or concerns arise.      Blair Olmos M.D.    40 minutes spent in review of records, patient consultation, documentation and coordination of care.  The relevant clinical matter(s) are summarized above.     Note to patient and family  The  21st Century Cures Act makes medical notes available to patients in the interest of transparency.  However, please be advised that this is a medical document.  It is intended as ejkf-hz-aqsk communication.  It is written and medical language may contain abbreviations or verbiage that are technical and unfamiliar.  It may appear blunt or direct.  Medical documents are intended to carry relevant information, facts as evident, and the clinical opinion of the practitioner.

## 2024-01-16 ENCOUNTER — ULTRASOUND ENCOUNTER (OUTPATIENT)
Dept: PERINATAL CARE | Facility: HOSPITAL | Age: 29
End: 2024-01-16
Attending: OBSTETRICS & GYNECOLOGY
Payer: COMMERCIAL

## 2024-01-16 VITALS
WEIGHT: 166 LBS | HEART RATE: 91 BPM | HEIGHT: 66 IN | SYSTOLIC BLOOD PRESSURE: 117 MMHG | DIASTOLIC BLOOD PRESSURE: 76 MMHG | BODY MASS INDEX: 26.68 KG/M2

## 2024-01-16 DIAGNOSIS — Z87.59 HISTORY OF PRIOR PREGNANCY WITH SMALL FOR GESTATIONAL AGE NEWBORN: ICD-10-CM

## 2024-01-16 DIAGNOSIS — O28.3 ABNORMAL FETAL ULTRASOUND: ICD-10-CM

## 2024-01-16 DIAGNOSIS — O35.03X0 CHOROID PLEXUS CYST OF FETUS AFFECTING CARE OF MOTHER, ANTEPARTUM, SINGLE OR UNSPECIFIED FETUS: ICD-10-CM

## 2024-01-16 DIAGNOSIS — O35.03X0: Primary | ICD-10-CM

## 2024-01-16 DIAGNOSIS — O35.03X0: ICD-10-CM

## 2024-01-16 LAB
GESTATIONAL AGE > OR = 9W:: YES
TEST RESULT: NEGATIVE
TRISOMY 13 (PATAU SYNDROME): NEGATIVE
TRISOMY 18 (EDWARDS SYNDROME): NEGATIVE
TRISOMY 21 (DOWN SYNDROME): NEGATIVE

## 2024-01-16 PROCEDURE — 76811 OB US DETAILED SNGL FETUS: CPT | Performed by: OBSTETRICS & GYNECOLOGY

## 2024-01-23 ENCOUNTER — ROUTINE PRENATAL (OUTPATIENT)
Dept: OBGYN CLINIC | Facility: CLINIC | Age: 29
End: 2024-01-23
Payer: COMMERCIAL

## 2024-01-23 VITALS
BODY MASS INDEX: 27 KG/M2 | DIASTOLIC BLOOD PRESSURE: 66 MMHG | WEIGHT: 170 LBS | SYSTOLIC BLOOD PRESSURE: 110 MMHG | HEART RATE: 102 BPM

## 2024-01-23 DIAGNOSIS — Z3A.23 23 WEEKS GESTATION OF PREGNANCY: Primary | ICD-10-CM

## 2024-01-23 DIAGNOSIS — O35.03X0 CHOROID PLEXUS CYST OF FETUS AFFECTING CARE OF MOTHER, ANTEPARTUM, SINGLE OR UNSPECIFIED FETUS: ICD-10-CM

## 2024-01-23 PROCEDURE — 3078F DIAST BP <80 MM HG: CPT | Performed by: NURSE PRACTITIONER

## 2024-01-23 PROCEDURE — 3074F SYST BP LT 130 MM HG: CPT | Performed by: NURSE PRACTITIONER

## 2024-01-23 RX ORDER — GARLIC EXTRACT 500 MG
1 CAPSULE ORAL DAILY
COMMUNITY

## 2024-01-23 NOTE — PROGRESS NOTES
FRANCIA--23w6d    Doing well. Denies Vb, LOF, contractions. + fetal movement.         A/P:   FHT-P  PNL: Reminded to complete previously ordered 1 hour gtt and CBC  RH positive  CPC: growth US with MFM at 32 weeks  Bacteriuria in pregnancy: complete previously ordered UC   Reviewed  labor precautions       Return in 4 weeks

## 2024-02-08 ENCOUNTER — LAB ENCOUNTER (OUTPATIENT)
Dept: LAB | Age: 29
End: 2024-02-08
Attending: OBSTETRICS & GYNECOLOGY
Payer: COMMERCIAL

## 2024-02-08 DIAGNOSIS — Z34.90 ENCOUNTER FOR SUPERVISION OF NORMAL PREGNANCY, ANTEPARTUM, UNSPECIFIED GRAVIDITY: ICD-10-CM

## 2024-02-08 LAB
BASOPHILS # BLD AUTO: 0.03 X10(3) UL (ref 0–0.2)
BASOPHILS NFR BLD AUTO: 0.2 %
EOSINOPHIL # BLD AUTO: 0.17 X10(3) UL (ref 0–0.7)
EOSINOPHIL NFR BLD AUTO: 1.4 %
ERYTHROCYTE [DISTWIDTH] IN BLOOD BY AUTOMATED COUNT: 13.6 %
GLUCOSE 1H P GLC SERPL-MCNC: 87 MG/DL
HCT VFR BLD AUTO: 36.1 %
HGB BLD-MCNC: 11.9 G/DL
IMM GRANULOCYTES # BLD AUTO: 0.09 X10(3) UL (ref 0–1)
IMM GRANULOCYTES NFR BLD: 0.7 %
LYMPHOCYTES # BLD AUTO: 2.61 X10(3) UL (ref 1–4)
LYMPHOCYTES NFR BLD AUTO: 20.9 %
MCH RBC QN AUTO: 30.3 PG (ref 26–34)
MCHC RBC AUTO-ENTMCNC: 33 G/DL (ref 31–37)
MCV RBC AUTO: 91.9 FL
MONOCYTES # BLD AUTO: 0.99 X10(3) UL (ref 0.1–1)
MONOCYTES NFR BLD AUTO: 7.9 %
NEUTROPHILS # BLD AUTO: 8.62 X10 (3) UL (ref 1.5–7.7)
NEUTROPHILS # BLD AUTO: 8.62 X10(3) UL (ref 1.5–7.7)
NEUTROPHILS NFR BLD AUTO: 68.9 %
PLATELET # BLD AUTO: 206 10(3)UL (ref 150–450)
RBC # BLD AUTO: 3.93 X10(6)UL
WBC # BLD AUTO: 12.5 X10(3) UL (ref 4–11)

## 2024-02-08 PROCEDURE — 82950 GLUCOSE TEST: CPT

## 2024-02-08 PROCEDURE — 85025 COMPLETE CBC W/AUTO DIFF WBC: CPT

## 2024-02-08 PROCEDURE — 36415 COLL VENOUS BLD VENIPUNCTURE: CPT

## 2024-02-21 ENCOUNTER — ROUTINE PRENATAL (OUTPATIENT)
Dept: OBGYN CLINIC | Facility: CLINIC | Age: 29
End: 2024-02-21
Payer: COMMERCIAL

## 2024-02-21 VITALS
BODY MASS INDEX: 28.28 KG/M2 | SYSTOLIC BLOOD PRESSURE: 110 MMHG | HEIGHT: 66 IN | WEIGHT: 176 LBS | DIASTOLIC BLOOD PRESSURE: 76 MMHG | HEART RATE: 88 BPM

## 2024-02-21 DIAGNOSIS — R10.2 PELVIC PAIN: ICD-10-CM

## 2024-02-21 DIAGNOSIS — Z34.80 SUPERVISION OF OTHER NORMAL PREGNANCY, ANTEPARTUM (HCC): Primary | ICD-10-CM

## 2024-02-21 DIAGNOSIS — Z23 NEED FOR VACCINATION: ICD-10-CM

## 2024-02-21 PROCEDURE — 3074F SYST BP LT 130 MM HG: CPT | Performed by: NURSE PRACTITIONER

## 2024-02-21 PROCEDURE — 3078F DIAST BP <80 MM HG: CPT | Performed by: NURSE PRACTITIONER

## 2024-02-21 PROCEDURE — 90471 IMMUNIZATION ADMIN: CPT | Performed by: NURSE PRACTITIONER

## 2024-02-21 PROCEDURE — 3008F BODY MASS INDEX DOCD: CPT | Performed by: NURSE PRACTITIONER

## 2024-02-21 PROCEDURE — 90715 TDAP VACCINE 7 YRS/> IM: CPT | Performed by: NURSE PRACTITIONER

## 2024-02-21 NOTE — PATIENT INSTRUCTIONS
Tdap Vaccine: What You Need To Know    1.  Why Get Vaccinated:    Tetanus, diphtheria, and pertussis can be very serious diseases, even for adolescents and adults.  Tdap vaccine can protect us from these diseases.    Tetanus (Lockjaw) causes painful muscle tightening and stiffness, usually all over the body.  It can lead to tightening of muscles in the head and neck so you can’t open your mouth, swallow, or sometimes even breathe.  Tetanus kills about 1 out of 5 people who are infected.  Diphtheria can cause a thick coating to form in the back of the throat, which can lead to breathing problems, paralysis, heart failure, and death.  Pertussis (Whooping Cough) causes severe coughing spells, which can cause difficulty breathing, vomiting, and disturbed sleep.  It can also lead to weight loss, incontinence, and rib fractures.  Up to 2 in 100 adolescents and 5 in 100 adults with pertussis are hospitalized or have complications, which could include pneumonia or death.    These diseases are caused by bacteria.  Diphtheria and pertussis are spread from person to person through coughing or sneezing.  Tetanus enters the body through cuts, scratches, or wounds.    Before vaccines, the [place] saw as many as 200,000 cases a year of diphtheria and pertussis, and hundreds of cases of tetanus.  Since vaccination began, tetanus and diphtheria have dropped by about 99% and pertussis by about 80%.    2.  Tdap Vaccine in Pregnancy    Pregnant women should get a dose of Tdap during every pregnancy in the third trimester, to protect the  from pertussis.  Infants are most at risk for severe, life-threatening complications from pertussis.      A similar vaccine, called Td, protects from tetanus and diphtheria, but not pertussis.   A Td booster should be given every 10 years.  Tdap may be given as one of these boosters if you have not already gotten a dose.  Tdap may also be given after a severe cut or burn to prevent tetanus  infection.      FETAL MOVEMENT CHART    Although not all women need to perform daily fetal movement counts, if you notice a decrease in fetal activity, you should contact our office immediately.      Begin counting fetal movements at 32 weeks of pregnancy.  You may find that your baby is more active after eating or drinking.       We want you to time how long it takes to feel 10 movements (kicks, flutters, swishes or rolls).  You should feel at least 10 movements in 2 hours.  You will likely feel 10 movements in less than 2 hours.    If you have concerns, you can use the attached table to record movements.  Record the time you feel the first movement and the tenth movement.  This will help you observe patterns and discover how long it normally takes your baby to move 10 times.       Please call us if any of the following occurs:  You have not felt the baby move for a couple of hours  It is taking longer each day to get the tenth movement    The main point is we want you to know the characteristics of your baby, so you can tell the doctor or nurse if something different is happening.    Again, if you notice a decrease in fetal movement, please give the office a call.    If our office is closed, the answering service will page the doctor on-call.    ------------------------------      Time M T W Th F S S                                                                                                                 Time M T W Th F S S                                                                                                           Time M T W Th F S S                                                                                                           Time M T W Th F S S

## 2024-02-21 NOTE — PROGRESS NOTES
FRANCIA  Doing well but pubic bone pain and it is much worse with movement. Will order PT.  GOOD FM  Denies VB/LOF/uctx  Rh positive, TDAP received  RTC in 2 wks  Fetal movement instructions given     ON : Colonoscopy reveraled innumerable ileal nodules with greatest concentration in the distal ileum, biopsied. Inverted appendix. No cecal mass. Normal colonic mucose in other areas. Recommended MRE to assess for proximal intestinal lesion and may consider antergrade anteroscopy depending on MRE results. CEA 4.7 (mild increase), increase ESR (24)  1/10: Hemotology rec iron supplements for 5 days. Currently with GI for enteroscopy.   ON : Pain is controlled with tylenol. Bowel prep started at 7pm. c/o bleeding (?menses and BRBPR) with clots. Hepatitis panel neg, HIV neg, reticulocyte WNL, Iron study WNL.  Serial CBC @12am:  Hb stable at 7.8 ON : Colonoscopy reveraled innumerable ileal nodules with greatest concentration in the distal ileum, biopsied. Inverted appendix. No cecal mass. Normal colonic mucose in other areas. Recommended MRE to assess for proximal intestinal lesion and may consider antergrade anteroscopy depending on MRE results. CEA 4.7 (mild increase), increase ESR (24)  1/10: Hemotology rec iron supplements for 5 days. Currently with GI for enteroscopy.   ON : Pain is controlled with tylenol. Bowel prep started at 7pm. c/o bleeding (?menses and BRBPR) with clots. Hepatitis panel neg, HIV neg, reticulocyte WNL, Iron study WNL.  Serial CBC @12am:  Hb stable at 7.8        SUBJECTIVE: Patient seen and examined bedside by surgery team.    heparin  Injectable 5000 Unit(s) SubCutaneous every 12 hours      Vital Signs Last 24 Hrs  T(C): 36.8 (11 Jan 2018 05:00), Max: 37.1 (11 Jan 2018 00:06)  T(F): 98.3 (11 Jan 2018 05:00), Max: 98.8 (11 Jan 2018 00:06)  HR: 84 (11 Jan 2018 05:00) (75 - 99)  BP: 105/65 (11 Jan 2018 05:00) (99/63 - 111/71)  BP(mean): --  RR: 17 (11 Jan 2018 05:00) (16 - 17)  SpO2: 99% (11 Jan 2018 05:00) (99% - 100%)  I&O's Detail    10 Shaun 2018 07:01  -  11 Jan 2018 07:00  --------------------------------------------------------  IN:    lactated ringers.: 1200 mL  Total IN: 1200 mL    OUT:    Voided: 700 mL  Total OUT: 700 mL    Total NET: 500 mL            Physical Exam  General: NAD, alert, interactive, appears comfortabl  Pulm: Nonlabored breathing, no respiratory distress  Abd: softly distended, mild tenderness in RLQ  Extrem: WWP, no edema, SCDs in place        LABS:                        7.2    9.1   )-----------( 166      ( 11 Jan 2018 08:20 )             25.2     01-11    138  |  102  |  4<L>  ----------------------------<  75  4.2   |  23  |  0.68    Ca    8.8      11 Jan 2018 08:20  Phos  3.3     01-11  Mg     1.5     01-11    TPro  x   /  Alb  x   /  TBili  x   /  DBili  <0.2  /  AST  x   /  ALT  x   /  AlkPhos  x   01-09    PT/INR - ( 10 Shaun 2018 07:51 )   PT: 11.8 sec;   INR: 1.06          PTT - ( 10 Shaun 2018 07:51 )  PTT:37.4 sec      RADIOLOGY & ADDITIONAL STUDIES:

## 2024-03-04 ENCOUNTER — ROUTINE PRENATAL (OUTPATIENT)
Dept: OBGYN CLINIC | Facility: CLINIC | Age: 29
End: 2024-03-04
Payer: COMMERCIAL

## 2024-03-04 ENCOUNTER — TELEPHONE (OUTPATIENT)
Dept: OBGYN CLINIC | Facility: CLINIC | Age: 29
End: 2024-03-04

## 2024-03-04 ENCOUNTER — LAB ENCOUNTER (OUTPATIENT)
Dept: LAB | Age: 29
End: 2024-03-04
Attending: NURSE PRACTITIONER
Payer: COMMERCIAL

## 2024-03-04 VITALS
BODY MASS INDEX: 28.97 KG/M2 | HEART RATE: 86 BPM | DIASTOLIC BLOOD PRESSURE: 60 MMHG | WEIGHT: 180.25 LBS | HEIGHT: 66 IN | SYSTOLIC BLOOD PRESSURE: 112 MMHG

## 2024-03-04 DIAGNOSIS — Z3A.29 29 WEEKS GESTATION OF PREGNANCY (HCC): Primary | ICD-10-CM

## 2024-03-04 DIAGNOSIS — Z34.80 SUPERVISION OF OTHER NORMAL PREGNANCY, ANTEPARTUM (HCC): ICD-10-CM

## 2024-03-04 LAB — T PALLIDUM AB SER QL IA: NONREACTIVE

## 2024-03-04 PROCEDURE — 36415 COLL VENOUS BLD VENIPUNCTURE: CPT

## 2024-03-04 PROCEDURE — 87389 HIV-1 AG W/HIV-1&-2 AB AG IA: CPT

## 2024-03-04 PROCEDURE — 86780 TREPONEMA PALLIDUM: CPT

## 2024-03-04 NOTE — PATIENT INSTRUCTIONS
KICK COUNT INSTRUCTIONS    After 28 weeks of pregnancy, we would like for you to monitor your baby’s movement daily by doing kick counts, an easy way for you to assess your baby’s well-being.    How to count kicks:  Choose a time when the baby is active, such as after a meal.  Sit comfortably or lie on your side, and count the number of movements in an hour… you should feel 10 movements in 2 hours    The evening hours seem to be the most convenient time to do this test, although you can also do this test anytime you are concerned about the baby’s movement.    Call the office right away at [phone] if you notice any of the following:  Your baby moves less than 10 times in 2 hours while you are doing kick counts.  Your baby moves much less often than on the days before.  You have not felt your baby move all day.

## 2024-03-04 NOTE — PROGRESS NOTES
Return OB Visit 28-33 WGA      GA: 29w5d  Vitals:    24 1020   BP: 112/60   Pulse: 86   Weight: 180 lb 4 oz (81.8 kg)   Height: 66\"       Doing well, +FM  Denies LOF/VB/uctx  Rh positive, TDAP 2024 received, EPDS Depression Scale Total: 0 (2024 11:25 AM)   PTL and Fetal movement instructions given  3rd T HIV already ordered      Patient Active Problem List    Diagnosis    Choroid plexus cysts, fetal, affecting care of mother, antepartum (HCC)     bilateral rigtht 0.58 cm and left 0.40 cm   [ x] MFM  [ ] F/U ultrasound at 32 weeks       Abnormal fetal ultrasound     limited visualization of nose, lips and profile  [X ] MFM      Encounter for supervision of normal pregnancy, antepartum ([facility])    Supervision of other normal pregnancy, antepartum ([facility])    Bacteriuria during pregnancy ([facility])     < 10x6 of staph and strep.    [X ] Repeat Urine culture neg      History of prior pregnancy with small for gestational age      3sfj0es at 41 wks   [ ] growth scan at 32 weeks      History of  delivery     PLTCS 2/2 Fetal intolerance to labor 10/15/21.  Considering TOLAC with scheduled CS around due date if no labor  [ ] considering TOLAC. Form given 3/4. Pt elected to review it at home and sign at next visit. Request for  sent.            RTC in 2 wks      Note to patient and family   The  [employer] Cures Act makes medical notes available to patients in the interest of transparency.  However, please be advised that this is a medical document.  It is intended as snus-kn-zipa communication.  It is written and medical language may contain abbreviations or verbiage that are technical and unfamiliar.  It may appear blunt or direct.  Medical documents are intended to carry relevant information, facts as evident, and the clinical opinion of the practitioner.      Facundo Maravilla MD

## 2024-03-04 NOTE — TELEPHONE ENCOUNTER
----- Message from Facundo Maravilla MD sent at 3/4/2024 10:58 AM CST -----  Please schedule for repeat  for Bryanna Kelsey after due date as she would like to try and go into labor.  Please confirm with patient once scheduled. Estimated Date of Delivery: 5/15/24    Surgeon: TROY  Assistant: yes     Type of Admit: Hospital Admit Inpatient  Procedure Location: L&D OR    Anesthesia: Spinal    Special Equipment/Comments: None      Antibiotics: Ancef 2g IV x 1 dose pre-op. No allergy to penicillin or cephalosporins.     Pre Op Orders: initiate my Pre-op standing orders, if none exist please use [facility]'s Standing Order     Labs: Type and Screen, CBC    Medical clearance: none

## 2024-03-18 ENCOUNTER — TELEPHONE (OUTPATIENT)
Dept: PHYSICAL THERAPY | Facility: HOSPITAL | Age: 29
End: 2024-03-18

## 2024-03-19 ENCOUNTER — TELEPHONE (OUTPATIENT)
Dept: PHYSICAL THERAPY | Facility: HOSPITAL | Age: 29
End: 2024-03-19

## 2024-03-20 ENCOUNTER — TELEPHONE (OUTPATIENT)
Dept: PHYSICAL THERAPY | Facility: HOSPITAL | Age: 29
End: 2024-03-20

## 2024-03-20 ENCOUNTER — OFFICE VISIT (OUTPATIENT)
Dept: PHYSICAL THERAPY | Age: 29
End: 2024-03-20
Attending: NURSE PRACTITIONER
Payer: COMMERCIAL

## 2024-03-20 ENCOUNTER — ROUTINE PRENATAL (OUTPATIENT)
Dept: OBGYN CLINIC | Facility: CLINIC | Age: 29
End: 2024-03-20
Payer: COMMERCIAL

## 2024-03-20 ENCOUNTER — TELEPHONE (OUTPATIENT)
Dept: PHYSICAL THERAPY | Age: 29
End: 2024-03-20

## 2024-03-20 VITALS
SYSTOLIC BLOOD PRESSURE: 110 MMHG | DIASTOLIC BLOOD PRESSURE: 68 MMHG | HEART RATE: 78 BPM | BODY MASS INDEX: 29.41 KG/M2 | WEIGHT: 183 LBS | HEIGHT: 66 IN

## 2024-03-20 DIAGNOSIS — O26.899 PELVIC PAIN AFFECTING PREGNANCY, ANTEPARTUM (HCC): ICD-10-CM

## 2024-03-20 DIAGNOSIS — O35.03X0 CHOROID PLEXUS CYST OF FETUS AFFECTING CARE OF MOTHER, ANTEPARTUM, SINGLE OR UNSPECIFIED FETUS (HCC): ICD-10-CM

## 2024-03-20 DIAGNOSIS — O28.3 ABNORMAL FETAL ULTRASOUND: ICD-10-CM

## 2024-03-20 DIAGNOSIS — R10.2 PELVIC PAIN: Primary | ICD-10-CM

## 2024-03-20 DIAGNOSIS — R82.71 BACTERIURIA DURING PREGNANCY (HCC): ICD-10-CM

## 2024-03-20 DIAGNOSIS — Z34.80 SUPERVISION OF OTHER NORMAL PREGNANCY, ANTEPARTUM (HCC): Primary | ICD-10-CM

## 2024-03-20 DIAGNOSIS — Z98.891 HISTORY OF CESAREAN DELIVERY: ICD-10-CM

## 2024-03-20 DIAGNOSIS — Z87.59 HISTORY OF PRIOR PREGNANCY WITH SMALL FOR GESTATIONAL AGE NEWBORN: ICD-10-CM

## 2024-03-20 DIAGNOSIS — O99.891 BACTERIURIA DURING PREGNANCY (HCC): ICD-10-CM

## 2024-03-20 DIAGNOSIS — R10.2 PELVIC PAIN AFFECTING PREGNANCY, ANTEPARTUM (HCC): ICD-10-CM

## 2024-03-20 PROCEDURE — 97112 NEUROMUSCULAR REEDUCATION: CPT

## 2024-03-20 PROCEDURE — 3074F SYST BP LT 130 MM HG: CPT | Performed by: OBSTETRICS & GYNECOLOGY

## 2024-03-20 PROCEDURE — 3008F BODY MASS INDEX DOCD: CPT | Performed by: OBSTETRICS & GYNECOLOGY

## 2024-03-20 PROCEDURE — 97162 PT EVAL MOD COMPLEX 30 MIN: CPT

## 2024-03-20 PROCEDURE — 3078F DIAST BP <80 MM HG: CPT | Performed by: OBSTETRICS & GYNECOLOGY

## 2024-03-20 NOTE — PROGRESS NOTES
FRANCIA    GA: 32w0d  Vitals:    24 0813   BP: 110/68   Pulse: 78   Weight: 183 lb (83 kg)   Height: 66\"       Doing well, +FM  Denies LOF/VB/uctx  Rh +, TDAP received, EPDS  0      Assessment:     Bryanna Kelsey is a 28 year old  at 32w0d who presents for routine OB visit. Overall doing well.     Problem List Items Addressed This Visit          Gravid and     History of  delivery    History of prior pregnancy with small for gestational age     Bacteriuria during pregnancy (HCC)    Choroid plexus cysts, fetal, affecting care of mother, antepartum ([facility])    Abnormal fetal ultrasound    Encounter for supervision of normal pregnancy, antepartum ([facility]) - Primary    Pelvic pain affecting pregnancy, antepartum ([facility])    Relevant Orders    Chiropractic Referral - External           Plan:     Patient Active Problem List    Diagnosis    Pelvic pain affecting pregnancy, antepartum (HCC)     Referred to PT and chiro      Choroid plexus cysts, fetal, affecting care of mother, antepartum ([facility])     bilateral rigtht 0.58 cm and left 0.40 cm   [ x] MFM  [ ] F/U ultrasound at 32 weeks       Abnormal fetal ultrasound     limited visualization of nose, lips and profile  [X ] MFM      Encounter for supervision of normal pregnancy, antepartum ([facility])    Supervision of other normal pregnancy, antepartum ([facility])    Bacteriuria during pregnancy ([facility])     < 10x6 of staph and strep.    [X ] Repeat Urine culture neg      History of prior pregnancy with small for gestational age      2wuc9sv at 41 wks   [ ] growth scan at 32 weeks      History of  delivery     PLTCS 2/2 Fetal intolerance to labor 10/15/21.  Considering TOLAC with scheduled CS around due date   RCS 24  [x ] TOLAC consent form          - continue routine prenatal care   - labor and rupture of membrane precautions provided  - Fetal movement instructions given    Return to clinic in 2 weeks for FRANCIA visit         Note to  patient and family   The 21st Century Cures Act makes medical notes available to patients in the interest of transparency.  However, please be advised that this is a medical document.  It is intended as xqiq-zt-fosj communication.  It is written and medical language may contain abbreviations or verbiage that are technical and unfamiliar.  It may appear blunt or direct.  Medical documents are intended to carry relevant information, facts as evident, and the clinical opinion of the practitioner.

## 2024-03-20 NOTE — PATIENT INSTRUCTIONS
[email or web address]     Chiropractor care: complete chiropractic care [phone], Dr. Miryam Treviño          FETAL MOVEMENT CHART    Although not all women need to perform daily fetal movement counts, if you notice a decrease in fetal activity, you should contact our office immediately.      Begin counting fetal movements at 32 weeks of pregnancy.  You may find that your baby is more active after eating or drinking.       We want you to time how long it takes to feel 10 movements (kicks, flutters, swishes or rolls).  You should feel at least 10 movements in 2 hours.  You will likely feel 10 movements in less than 2 hours.    If you have concerns, you can use the attached table to record movements.  Record the time you feel the first movement and the tenth movement.  This will help you observe patterns and discover how long it normally takes your baby to move 10 times.       Please call us if any of the following occurs:  You have not felt the baby move for a couple of hours  It is taking longer each day to get the tenth movement    The main point is we want you to know the characteristics of your baby, so you can tell the doctor or nurse if something different is happening.    Again, if you notice a decrease in fetal movement, please give the office a call.    If our office is closed, the answering service will page the doctor on-call.    ------------------------------      Time M T W Th F S S                                                                                                                 Time M T W Th F S S                                                                                                           Time M T W Th F S S                                                                                                           Time M T W Th F S S

## 2024-03-20 NOTE — PROGRESS NOTES
MUSCULOSKELETAL AND PELVIC FLOOR EVALUATION:     Diagnosis:   Pelvic pain (R10.2)      Referring Provider: Bernie  Date of Evaluation:    3/20/2024    Precautions:  None    Currently Pregnant: 32w0d Next MD visit:   3/27/2024 MFM growth scan  2024  Date of Surgery: n/a     PATIENT SUMMARY   Bryanna Kelsey is a 28 year old female who presents to therapy today with complaints of pubic symphysis pain, LLE pain. Pt reports she is not having LBP. Pt reports thinking it is a combination of decreased strength; thinks her pelvis is misaligned & pubic symphysis is not bearing the weight as she needs to- feels better when it pops- feels it is popping back into place. Will get radiating pain down to LLE - if not popping. Stops back of calf. Will get some tingling. No current or prior use of stabilization brace.    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    Pt describes pain level: worst 7-8/10.     Pregnant Now: Yes, 32w0d SHANNAN 5/15/2024  Obstetrical/Gynecological history: . : son will be 2.5 in April; expecting baby boy. Will like to try for  if labor naturally progresses but will have  otherwise  No PT during first pregnancy or postpartum  Difficult recovery after     Occupation/Activities: BUD prn at [employer]    Bryanna describes prior level of function: current symptoms started about 26-28 weeks. Pt goals include \"to have less pain.\"  Past medical history was reviewed with Bryanna. No significant findings identified.    URINARY HABITS  No complaints, no leakage  Increased urinary frequency (pregnancy)  Maybe could have better water intake    BOWEL HABITS  Types of symptoms: none  Frequency of bowel movements: every day  Stool consistency: [place] Stool Scale: 2-3  Do you strain with defecation: No   Laxative use: no  Uses Squatty Potty    SEXUAL HEALTH  STATUS  Sexual Grayson Status: less frequent, not on pelvic rest  Pain with initial and/or deep penetration: yes (just during pregnancy)    ASSESSMENT  Bryanna presents to physical therapy evaluation  & 32w0d with primary c/o pubic symphysis pain, LLE pain. The results of the objective tests and measures show impairments in posture, pelvic alignment, gait, soft tissue mobility, ROM, strength, flexibility. 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 Signs and symptoms are consistent with diagnosis of Pelvic pain (R10.2). Pt and PT discussed evaluation findings, pathology, POC and HEP. Pt voiced understanding and performs HEP correctly without reported pain. Skilled Pelvic Physical Therapy is medically necessary to address the above impairments and reach functional goals.    OBJECTIVE:   HOB elevated to 45 degrees in supine for pregnancy precaution.    Posture: bilateral genu varum & recurvatum, B pes planus, glute clenching with pregnancy swayback in stance  Pelvic Alignment: obliquity: L posterior ilial rotation  Gait: pt ambulates on level ground with no waddle; decreased trunk rotation    External Palpation: TTP with increased STRs L glutes/ piriformis, & pubic symphysis. B calf supple & pain-free; B pedal pulses intact.    Range Of Motion  Thoracic AROM: Rotation Mod restricted bilaterally  LE AROM screen: restricted L>R hip IR & ER    Strength (MMT): Hip Abduction L 3+/5, R 4-/5    Flexibility Summary: Mod restricted B HS & piriformis    Today's Treatment and Response:   Patient education was provided on objective findings of external evaluation and expectations with treatment outcomes. Educated on potential of normal soreness from session today.    Patient was instructed in and issued a HEP for:   Access Code: [email or web address]  URL:  [email or web address]  Date: 03/20/2024  Prepared by: Alice Phillips    Exercises  - Seated Hip Adduction Squeeze with Ball  - 1 x daily - 7 x weekly - 10 reps - 10 sec hold  - Seated Hip Abduction with Resistance  - 1 x daily - 7 x weekly - 10 reps - 10 sec hold (Gray TheraBand)    Performed manual MET techniques to correct pelvic obliquity - improved alignment achieved    Pt recommended to check SI/ Serola belt for external support/ stability    Charges: PT Eval Moderate Complexity, Neuro x 1      Total Timed Treatment: 13 min     Total Treatment Time: 43 min    Based on clinical rationale and outcome measures, this evaluation involved Moderate Complexity decision making due to 1-2 personal factors/comorbidities, 4+ body structures involved/activity limitations, and evolving symptoms including pelvic pain  PLAN OF CARE:    Goals: (to be met in 8 visits)  Patient exhibits an increase in bilateral hip Abduction strength to 4/5 to allow for ambulation, standing on 1 leg for getting dressed, & stairs.  Patient demonstrates improved thoracic rotation AROM to Min restricted bilaterally for improved rolling over in bed.  Patient demonstrates ability to manage any pelvic obliquity with self-MET techniques and/ or wear of SI belt as indicated.  Patient reports pain at worst 3-4/10 for improved ability to complete work duties throughout remainder of pregnancy.  Patient understands importance of performing HEP to prevent reoccurrence of symptoms.     Frequency / Duration: Patient will be seen for 1-2 x/week or a total of 8 visits over a 90 day period. Treatment will include: Manual Therapy, Neuromuscular Re-education, Self-Care Home Management, Therapeutic Activities, Therapeutic Exercise, and Home Exercise Program instruction     Education or treatment limitation: None  Rehab Potential:excellent    Patient/Family/Caregiver was advised of these findings, precautions, and treatment options and has agreed to actively  participate in planning and for this course of care.    Thank you for your referral. Please co-sign or sign and return this letter via fax as soon as possible to [phone]. If you have any questions, please contact me at Dept: [phone]    Sincerely,  Electronically signed by therapist: Alice Phillips PT    Physician's certification required: Yes  I certify the need for these services furnished under this plan of treatment and while under my care.    X___________________________________________________ Date____________________    Certification From: 3/20/2024  To:6/18/2024

## 2024-03-21 NOTE — PATIENT INSTRUCTIONS
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 She subsequently completed her Doctor of Physical Therapy from [employer] in 2013. Alice’s experience is primarily with orthopedics, but also has experience in pediatrics as well.     Alice has been a certified provider of the Gr[employer] Techn[employer] instrument-assisted soft tissue mobilization since 2015 & has further earned the title of [employer] Specialist in 2020. Alice is continuing her passion for learning by pursuing training through the [facility] to effectively treat patients with pelvic floor related disorders. 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    Alice thrives on treating patients with empathy & compassion to provide the individualized care that all patients need & deserve. Alice aims to empower patients with the tools to feel in charge of their recovery, knowing they can advocate for themselves & achieve maximum success when they understand the treatments that work best for them.    When Alice is not at work, she enjoys exercising with [employer] classes, scenic walks outside, baking, & traveling with her family.     Alice is accepting new patients at the [facility]. To schedule or change an appointment, call [phone]. To speak with Alice, call [phone] or message on [employer].                        Access Code: [email or web address]  URL: [email or web address]  Date: 03/20/2024  Prepared by: Alice Phillips    Exercises  - Seated Hip Adduction Squeeze with Ball  - 1 x daily - 7 x weekly - 10 reps - 10 sec hold  - Seated Hip Abduction with Resistance   - 1 x daily - 7 x weekly - 10 reps - 10 sec hold

## 2024-03-22 ENCOUNTER — APPOINTMENT (OUTPATIENT)
Facility: LOCATION | Age: 29
End: 2024-03-22
Attending: NURSE PRACTITIONER
Payer: COMMERCIAL

## 2024-03-25 ENCOUNTER — TELEPHONE (OUTPATIENT)
Dept: OBGYN CLINIC | Facility: CLINIC | Age: 29
End: 2024-03-25

## 2024-03-25 NOTE — TELEPHONE ENCOUNTER
Patient is currently seeing providers that will deliver for her at [facility]. She is considering switching care to midwifery and has a few preliminary questions. Please advise.

## 2024-03-26 ENCOUNTER — OFFICE VISIT (OUTPATIENT)
Dept: PHYSICAL THERAPY | Age: 29
End: 2024-03-26
Attending: NURSE PRACTITIONER
Payer: COMMERCIAL

## 2024-03-26 PROCEDURE — 97110 THERAPEUTIC EXERCISES: CPT

## 2024-03-26 NOTE — PROGRESS NOTES
Diagnosis:   Pelvic pain (R10.2)      Referring Provider: Bernie  Date of Evaluation:    3/20/2024    Precautions:  None    Currently Pregnant:   32w6d, SHANNAN 5/15/2024   Next MD visit:   3/27/2024 MFM growth scan  4/2/2024  Date of Surgery: n/a   Insurance Primary/Secondary: [employer] H[place] / N/A     # Auth Visits: 5 visits 3/19 to 5/31           Subjective: Pt reports  no questions, no current pain- had some yesterday. Denies: visual changes, LE swelling, headaches    Pain: 0/10      Objective: See Flowsheet for details      Assessment: Focused on exercise progression to update HEP for pt in case today is her last scheduled (due to insurance change). Pt reported acknowledging she needs to stretch when completing stretches & feeling the muscle tightness- with excellent tolerance to ex's today. No pain with ex's & pt verbalized understanding of all new ex's.      Goals: (to be met in 8 visits)  Patient exhibits an increase in bilateral hip Abduction strength to 4/5 to allow for ambulation, standing on 1 leg for getting dressed, & stairs.  Patient demonstrates improved thoracic rotation AROM to Min restricted bilaterally for improved rolling over in bed.  Patient demonstrates ability to manage any pelvic obliquity with self-MET techniques and/ or wear of SI belt as indicated.  Patient reports pain at worst 3-4/10 for improved ability to complete work duties throughout remainder of pregnancy.  Patient understands importance of performing HEP to prevent reoccurrence of symptoms.     Plan: HEP updated for pt as she has to figure out if she is able to continue due to insurance reasons- pt to keep PT posted- may message on [employer] and/ or call central scheduling  Date: 3/26/2024  TX#: 2/8 Date:                 TX#: 3/ Date:                 TX#: 4/ Date:                 TX#: 5/ Date:   Tx#: 6/   Therex: 42'  HEP review & update  POC- insurance  Seated HS stretch 3x30\" ea  Seated figure-4 piriformis stretch 3x30\" ea  Seated active  thoracic rotation stretch 5x10\" ea  Standing at wall open the book stretch 10x5\" ea  Flat back stretch @ // bars 3x30\"  Hip Add stretch @ // bars 3x30\" ea  TENS for labor pain- pt curious. Pt to check with OBGYN team for clearance first.       HEP:   Access Code: [email or web address]  URL: [email or web address]  Date: 03/20/2024  Prepared by: Alice Phillips    Exercises  - Seated Hip Adduction Squeeze with Ball  - 1 x daily - 7 x weekly - 10 reps - 10 sec hold  - Seated Hip Abduction with Resistance  - 1 x daily - 7 x weekly - 10 reps - 10 sec hold (Gray TheraBand)    Pt recommended to check SI/ Serola belt for external support/ stability      Access Code: [email or web address]  URL: [email or web address]  Date: 03/26/2024  Prepared by: Alice Phillips    Exercises  - Seated Hamstring Stretch  - 1-3 x daily - 7 x weekly - 3 sets - 30 sec hold  - Seated Piriformis Stretch  - 1-3 x daily - 7 x weekly - 3 sets - 30 sec hold  - Seated Thoracic Rotation  - 1-3 x daily - 7 x weekly - 1 sets - 5 reps - 10 sec hold  - Standing Thoracic Open Book at Wall  - 1-3 x daily - 7 x weekly - 1 sets - 10 reps - 5 sec hold  - Standing 'L' Stretch at Counter  - 1-3 x daily - 7 x weekly - 3 sets - 30 sec hold  - Seated Hip Adductor Stretch  - 1-3 x daily - 7 x weekly - 3 sets - 30 sec hold    Charges: Therex x 3       Total Timed Treatment: 42 min  Total Treatment Time: 42 min

## 2024-03-26 NOTE — PATIENT INSTRUCTIONS
Access Code: [email or web address]  URL: [email or web address]  Date: 03/26/2024  Prepared by: Alice Phillips    Exercises  - Seated Hamstring Stretch  - 1-3 x daily - 7 x weekly - 3 sets - 30 sec hold  - Seated Piriformis Stretch  - 1-3 x daily - 7 x weekly - 3 sets - 30 sec hold  - Seated Thoracic Rotation  - 1-3 x daily - 7 x weekly - 1 sets - 5 reps - 10 sec hold  - Standing Thoracic Open Book at Wall  - 1-3 x daily - 7 x weekly - 1 sets - 10 reps - 5 sec hold  - Standing 'L' Stretch at Counter  - 1-3 x daily - 7 x weekly - 3 sets - 30 sec hold  - Seated Hip Adductor Stretch  - 1-3 x daily - 7 x weekly - 3 sets - 30 sec hold

## 2024-03-27 ENCOUNTER — OFFICE VISIT (OUTPATIENT)
Dept: PERINATAL CARE | Facility: HOSPITAL | Age: 29
End: 2024-03-27
Attending: OBSTETRICS & GYNECOLOGY
Payer: COMMERCIAL

## 2024-03-27 ENCOUNTER — TELEPHONE (OUTPATIENT)
Dept: FAMILY MEDICINE CLINIC | Facility: CLINIC | Age: 29
End: 2024-03-27

## 2024-03-27 ENCOUNTER — TELEPHONE (OUTPATIENT)
Dept: ADMINISTRATIVE | Age: 29
End: 2024-03-27

## 2024-03-27 VITALS
SYSTOLIC BLOOD PRESSURE: 111 MMHG | DIASTOLIC BLOOD PRESSURE: 70 MMHG | WEIGHT: 182 LBS | HEIGHT: 66 IN | BODY MASS INDEX: 29.25 KG/M2 | HEART RATE: 81 BPM

## 2024-03-27 DIAGNOSIS — O35.03X0: ICD-10-CM

## 2024-03-27 DIAGNOSIS — O35.03X0: Primary | ICD-10-CM

## 2024-03-27 DIAGNOSIS — Z87.59 HISTORY OF PRIOR PREGNANCY WITH SMALL FOR GESTATIONAL AGE NEWBORN: ICD-10-CM

## 2024-03-27 DIAGNOSIS — O35.03X0 CHOROID PLEXUS CYST OF FETUS AFFECTING CARE OF MOTHER, ANTEPARTUM, SINGLE OR UNSPECIFIED FETUS (HCC): ICD-10-CM

## 2024-03-27 PROCEDURE — 76819 FETAL BIOPHYS PROFIL W/O NST: CPT

## 2024-03-27 PROCEDURE — 76816 OB US FOLLOW-UP PER FETUS: CPT | Performed by: OBSTETRICS & GYNECOLOGY

## 2024-03-27 NOTE — TELEPHONE ENCOUNTER
Left message for patient that referral to chiropractor that was submitted by her OB provider is out of network.

## 2024-03-27 NOTE — PROGRESS NOTES
Outpatient Maternal-Fetal Medicine Follow-Up     Dear Dr. Armstrong,     Thank you for requesting ultrasound evaluation and maternal fetal medicine consultation on your patient Bryanna Kelsey.  As you are aware she is a 28 year old female  with a Verdin pregnancy and an Estimated Date of Delivery: 5/15/24.  She returned to maternal-fetal medicine today for a follow-up visit.  Her history was reviewed from her prior visit and there were no interval changes.     Antepartum Risk Factors  Prior plexuses noted at the 20-week ultrasound        S: She reports good fetal movement.  She has some irregular cramping but no signs of labor.  PHYSICAL EXAMINATION:  /70 (BP Location: Right arm, Patient Position: Sitting, Cuff Size: adult)   Pulse 81   Ht 5' 6\" (1.676 m)   Wt 182 lb (82.6 kg)   LMP 09/15/2023 (Exact Date)   BMI 29.38 kg/m²   General: alert and oriented, no acute distress  Abdomen: gravid, soft, non-tender  Extremities: non-tender, no edema     OBSTETRIC ULTRASOUND  The patient had a fetal growth and BPP ultrasound today which I interpreted the results and reviewed them with the patient.    Ultrasound Findings:  Single IUP in cephalic presentation.    Placenta is anterior.   Cardiac activity is present at 132 bpm  EFW 2082 g ( 4 lb 9 oz); 44%.    IVAN is  11.9 cm.  MVP is 5.9 cm  BPP is 8/8.     Left CPC has resolved.     The fetal measurements are consistent with established EDC. No gross ultrasound evidence of structural abnormalities are seen today. The patient understands that ultrasound cannot rule out all structural and chromosomal abnormalities.     See imaging tab for the complete US report.     DISCUSSION  During her visit we discussed and reviewed the following issues:  CHOROID PLEXUS CYSTS  The choroid plexus is a structure that lines the wall of the lateral ventricles and is responsible for the production of cerebrospinal fluid.  Choroid plexus cysts (CPC) consist of columnar and  cuboidal epithelium and an underlying network of blood vessels that project from the medial wall of the lateral ventricle.  The incidence of CPC's in the general population is about 1-2%.  CPC's have been associated with fetal aneuploidy, specifically, trisomy 18.  However, in fetuses with trisomy 18 other significant structural anomalies are invariably present.  Hence, in the absence of other structural anomalies the trisomy 18 risk is not felt to be elevated. In most studies, the risk of aneuploidy with an isolated CPC is similar to the patient's age-related aneuploidy risk.  The presence of bilateral or large CPC's does not change the risk.  If other abnormal sonographic findings are present genetic testing is offered..      We reviewed the signs and symptoms of preeclampsia,  labor and monitoring fetal movement.  We discussed reasons for her to call her physician.       IMPRESSION:  IUP at 33w0d  Normal fetal growth and  testing  Choroid Plexus Cyst -resolved     RECOMMENDATIONS:  Continue care with Dr. Armstrong       Total time spent 20 minutes this calendar day which includes preparing to see the patient including chart review, obtaining and/or reviewing additional medical history, performing a physical exam and evaluation, documenting clinical information in the electronic medical record, independently interpreting results, counseling the patient, communicating results to the patient/family/caregiver and coordinating care.     Case discussed with patient who demonstrated understanding and agreement with plan.     Thank you for allowing me to participate in the care of this patient.  Please feel free to contact me with any questions.    Krysta Palmer MD  Maternal-Fetal Medicine       Note to patient and family:  The 21st Century Cures Act makes medical notes available to patients in the interest of transparency.  However, please be advised that this is a medical document.  It is intended as a peer  to peer communication.  It is written in medical language and may contain abbreviations or verbiage that are technical and unfamiliar.  It may appear blunt or direct.  Medical documents are intended to carry relevant information, facts as evident, and the clinical opinion of the practitioner.

## 2024-03-27 NOTE — PROGRESS NOTES
Pt here for growth ultrasound  + FM  Pt c/o irreg uterine cramping, denies vag bleeding and leaking fluid.

## 2024-03-27 NOTE — TELEPHONE ENCOUNTER
Hello    This request has been closed by [employer].  The referred to provider is out of network and insurance is requiring a redirection to an in network provider.   Please see message below from [employer].      Thank you,  Patience     Comment:   This is a referral to an out of network provider. Please refer this member to an in-network chiropractor. In compliance with the [facility] Guidelines, this case will be closed as there is no clinical documentation to substantiate the highly specialized care or medical necessity to approve out of network services. If you need assistance finding an in-network provider, please utilize the [email or web address] website or contact customer service at [phone]

## 2024-04-01 ENCOUNTER — OFFICE VISIT (OUTPATIENT)
Dept: OBGYN CLINIC | Facility: CLINIC | Age: 29
End: 2024-04-01

## 2024-04-01 VITALS — DIASTOLIC BLOOD PRESSURE: 76 MMHG | SYSTOLIC BLOOD PRESSURE: 111 MMHG | WEIGHT: 185 LBS | BODY MASS INDEX: 30 KG/M2

## 2024-04-01 DIAGNOSIS — Z71.89 PRENATAL CONSULT: Primary | ICD-10-CM

## 2024-04-01 PROCEDURE — 3078F DIAST BP <80 MM HG: CPT | Performed by: ADVANCED PRACTICE MIDWIFE

## 2024-04-01 PROCEDURE — 3074F SYST BP LT 130 MM HG: CPT | Performed by: ADVANCED PRACTICE MIDWIFE

## 2024-04-03 ENCOUNTER — APPOINTMENT (OUTPATIENT)
Facility: LOCATION | Age: 29
End: 2024-04-03
Attending: NURSE PRACTITIONER
Payer: COMMERCIAL

## 2024-04-08 ENCOUNTER — APPOINTMENT (OUTPATIENT)
Facility: LOCATION | Age: 29
End: 2024-04-08
Attending: NURSE PRACTITIONER
Payer: COMMERCIAL

## 2024-04-10 ENCOUNTER — APPOINTMENT (OUTPATIENT)
Facility: LOCATION | Age: 29
End: 2024-04-10
Attending: NURSE PRACTITIONER
Payer: COMMERCIAL

## 2024-04-15 ENCOUNTER — APPOINTMENT (OUTPATIENT)
Facility: LOCATION | Age: 29
End: 2024-04-15
Attending: NURSE PRACTITIONER
Payer: COMMERCIAL

## 2024-04-15 ENCOUNTER — ROUTINE PRENATAL (OUTPATIENT)
Dept: OBGYN CLINIC | Facility: CLINIC | Age: 29
End: 2024-04-15
Payer: COMMERCIAL

## 2024-04-15 VITALS
DIASTOLIC BLOOD PRESSURE: 70 MMHG | BODY MASS INDEX: 31 KG/M2 | WEIGHT: 190.38 LBS | SYSTOLIC BLOOD PRESSURE: 110 MMHG | HEART RATE: 106 BPM

## 2024-04-15 DIAGNOSIS — Z34.80 SUPERVISION OF OTHER NORMAL PREGNANCY, ANTEPARTUM (HCC): Primary | ICD-10-CM

## 2024-04-15 PROCEDURE — 87081 CULTURE SCREEN ONLY: CPT | Performed by: OBSTETRICS & GYNECOLOGY

## 2024-04-15 PROCEDURE — 87150 DNA/RNA AMPLIFIED PROBE: CPT | Performed by: OBSTETRICS & GYNECOLOGY

## 2024-04-15 PROCEDURE — 3078F DIAST BP <80 MM HG: CPT | Performed by: OBSTETRICS & GYNECOLOGY

## 2024-04-15 PROCEDURE — 3074F SYST BP LT 130 MM HG: CPT | Performed by: OBSTETRICS & GYNECOLOGY

## 2024-04-15 NOTE — PROGRESS NOTES
35w5d seen for routine OB visit.   Denies ctx, lof, vb. Reports good FM.    Patient Active Problem List   Diagnosis    History of  delivery    History of prior pregnancy with small for gestational age     Bacteriuria during pregnancy ([facility])    Supervision of other normal pregnancy, antepartum ([facility])    Choroid plexus cysts, fetal, affecting care of mother, antepartum ([facility])    Abnormal fetal ultrasound    Encounter for supervision of normal pregnancy, antepartum ([facility])    Pelvic pain affecting pregnancy, antepartum ([facility])        TW lb 6 oz (21 kg)   Depression Scale Total: 0 (2024 11:25 AM)      Gen: AAOx3, NAD  Resp: breathing comfortably  Abdomen: gravid, soft, nontender  Ext: nontender, no edema  SVE: deferred    Plan:  - GBS collected  - 3T labs neg  - s/p tdap  - scheduled for repeat CS , desires TOLAC. Discussed option of IOL if favorable cervix. Plan for membrane sweep if dilated full term.  - return precautions reviewed    RTO in 1 week(s).       Chaperone offered and declined.

## 2024-04-16 LAB — GROUP B STREP BY PCR FOR PCR OVT: NEGATIVE

## 2024-04-17 ENCOUNTER — APPOINTMENT (OUTPATIENT)
Facility: LOCATION | Age: 29
End: 2024-04-17
Attending: NURSE PRACTITIONER
Payer: COMMERCIAL

## 2024-04-23 ENCOUNTER — TELEPHONE (OUTPATIENT)
Dept: OBGYN CLINIC | Facility: CLINIC | Age: 29
End: 2024-04-23

## 2024-04-24 ENCOUNTER — ROUTINE PRENATAL (OUTPATIENT)
Dept: OBGYN CLINIC | Facility: CLINIC | Age: 29
End: 2024-04-24
Payer: COMMERCIAL

## 2024-04-24 VITALS
HEIGHT: 66 IN | HEART RATE: 80 BPM | SYSTOLIC BLOOD PRESSURE: 110 MMHG | WEIGHT: 191 LBS | DIASTOLIC BLOOD PRESSURE: 64 MMHG | BODY MASS INDEX: 30.7 KG/M2

## 2024-04-24 DIAGNOSIS — Z34.80 SUPERVISION OF OTHER NORMAL PREGNANCY, ANTEPARTUM (HCC): Primary | ICD-10-CM

## 2024-04-24 PROCEDURE — 3078F DIAST BP <80 MM HG: CPT | Performed by: NURSE PRACTITIONER

## 2024-04-24 PROCEDURE — 3008F BODY MASS INDEX DOCD: CPT | Performed by: NURSE PRACTITIONER

## 2024-04-24 PROCEDURE — 3074F SYST BP LT 130 MM HG: CPT | Performed by: NURSE PRACTITIONER

## 2024-04-24 NOTE — PROGRESS NOTES
FRANCIA  Doing well, +FM  Denies VB/LOF/uctx  Mode of delivery: RCS scheduled, prefers TOLAC  Labor precautions discussed  SVE declined   RTC 1 week

## 2024-05-01 ENCOUNTER — ROUTINE PRENATAL (OUTPATIENT)
Dept: OBGYN CLINIC | Facility: CLINIC | Age: 29
End: 2024-05-01
Payer: COMMERCIAL

## 2024-05-01 VITALS
BODY MASS INDEX: 30.72 KG/M2 | SYSTOLIC BLOOD PRESSURE: 112 MMHG | HEIGHT: 66 IN | WEIGHT: 191.13 LBS | DIASTOLIC BLOOD PRESSURE: 66 MMHG | HEART RATE: 88 BPM

## 2024-05-01 DIAGNOSIS — Z34.90 PRENATAL CARE, ANTEPARTUM (HCC): Primary | ICD-10-CM

## 2024-05-01 PROCEDURE — 3008F BODY MASS INDEX DOCD: CPT | Performed by: OBSTETRICS & GYNECOLOGY

## 2024-05-01 PROCEDURE — 3074F SYST BP LT 130 MM HG: CPT | Performed by: OBSTETRICS & GYNECOLOGY

## 2024-05-01 PROCEDURE — 3078F DIAST BP <80 MM HG: CPT | Performed by: OBSTETRICS & GYNECOLOGY

## 2024-05-01 NOTE — PROGRESS NOTES
Chief Complaint   Patient presents with    Prenatal Care     Aniceto     Routine prenatal visit without complaints.  Patient denies any bleeding, leaking fluid, cramping, or regular uterine contractions. Good fetal movement.  SVE: declined  Assessment/Plan:  38w0d doing well  GBS neg  Hx CS- desires trial of labor.  Scheduled for RCS 5/17/24  Kick counts reviewed.  Reviewed labor signs and symptoms.  Diagnoses and all orders for this visit:    Prenatal care, antepartum (HCC)       Return in about 1 week (around 5/8/2024) for Routine Prenatal Visit.

## 2024-05-01 NOTE — PATIENT INSTRUCTIONS
Labor Instructions    How do I know if it’s true labor?  One of the most important aspects of any pregnancy is being able to recognize the onset of labor.  Unfortunately, on occasion it can be difficult or confusing, especially if you have had one or more children.  Each labor can begin in a different way even if you have had four or five children.    If this is your first child, it is very common to have labor for an average greater than 10 hours; however, there have been rare instances for labor to be two hours or less for a first time mother. It is more important for you to know if this is your second or third baby to realize that any labor after the first is usually shorter.  There is no way to tell how long or short the labor will be. Therefore, please call us if you are unsure labor has started.       Usually, during the last six weeks of pregnancy, Luís-Castillo contractions or “false labor pains occur”.  False labor is generally not very painful though it is not always easy to tell.  You may feel contractions, cramps or uterine tightening somewhere between every 3-30 minutes but they will not continue to get stronger over time.  If you lie down, drink plenty of fluid or walk around, the contractions may go away.   False contractions are very common if you have been active on your feet for several hours.   Women frequently worry about being sent home from the hospital without having their baby (i.e. the labor stopped).  Actually, this is an unnecessary worry, for this is an infrequent occurrence.     True labor usually begins in one of two ways.  In most patients it begins with contractions of the uterus, which are irregular (but not always) in the beginning.  They are cramp-like in character and feel similar to menstrual cramps.  After a while, they become more regular, and they seem to last a little longer, and feel a little sharper.  These symptoms are very important-more important- than the timing of the  contractions.  Having regular (usually closer), longer lasting (35-70 seconds), and sharper (more painful) contractions are the common symptoms of actual labor.  The second way in which labor can begin which occurs in approximately 30% of all patients is the rupture of the bag of water.  This is a sudden gush of watery fluid, usually sufficient to run down your leg and onto the floor, or you may wet a large area of the bed if it happens at night.  There may also be tricking that is uncontrolled.  If you are unsure, please call the office.      When should I call?  When contractions are strong and every 3-5 minutes.  If you have a gush of water or you think you might be leaking fluid.  If you are bleeding heavily.  If your baby is not moving around at least every 1-2 hours.  If you are worried about something.  When you think you are ready to go to the hospital.    Who do I call?  Call the office at [phone].  If the office is closed, the answering service will send a message to the physician on call.  The on call physician will be available for emergency phone calls only.          Can I eat in labor?  It is good to eat a light meal at home before going to the hospital.  Eat foods like crackers, popsicles, soup and fruit.  Avoid foods that are difficult to digest like meat, a lot of dairy products and high fat foods.  DO NOT EAT if you know you are scheduled for a  ().      What will happen when I get to the hospital?  When you arrive at the hospital, you will be admitted and examined.   There are a few factors that will determine if you will be allowed to be up out of bed or if you would need to stay in bed.  The main factors are how well the baby is entering into the pelvis and if the bag of baltazar is in intact or ruptured.  An intravenous (IV) solution will, with exceptions, be started.  This is extremely important especially for the baby.   Your  will be allowed in the room during your  labor. During the delivery, the nurses will inform you of the hospital policy and how many coaches are allowed.  You may desire pain medication or anesthesia for pain.  You probably discussed some aspects of pain medication with us during your prenatal care.  The various options may also have been discussed in Prenatal Classes.  We utilize IV narcotics and epidural anesthesia when our patients request to have them.  If you chose to have no anesthesia, none will be administered.  A local anesthetic may be used at the time of delivery      What should I to bring to the hospital?  Maternity clothes to go home in  You can bring your own night gown to wear after giving birth, but most women prefer to wear the hospital gown because it may get soiled  Nursing Bra if you are planning to breastfeed  Clothes for your baby to go home in  Baby Car Seat.  Be sure you know how to install it correctly. Please install it before going to the hospital  Routine toiletries like toothbrush, shampoo, hairbrush and etc.   You can bring your favorite pillow, but please put a colored pillow case on it so it doesn’t get mixed up with hospital pillows    How long will I stay in the hospital?  The date you leave the hospital may vary depending on the speed of your recovery.  If you have a vaginal delivery, you will stay in the hospital 24-48 hours after your delivery as long as you aren’t having any complications.    If you have had a , you will stay in the hospital 48-72 hours as long as you aren’t having any complications.       Going Home Instructions  There are no set rules as to what you may do each day or week after you are home.  You will receive discharge instructions to help you each day.  Remember, early ambulation in the hospital is to prevent complications.  Do not let this lull you into a false sense of strength or ability to do certain physical acts which may tire you excessively.  Please call the office within a few days  after you are discharged from the hospital to schedule your post-partum visit, which is usually 4-6 weeks after delivery.    Any medications necessary will be discussed on an individual basis.  If you decide to breastfeed your baby, you should continue your prenatal vitamins.  If you do not breastfeed, simply finish the prenatal vitamins you have.      The staff at [facility] wish you a joyous and exciting birth.  If there is anything we can do to make this a better experience for you please do not hesitate to ask.       FETAL MOVEMENT CHART    Although not all women need to perform daily fetal movement counts, if you notice a decrease in fetal activity, you should contact our office immediately.      Begin counting fetal movements at 32 weeks of pregnancy.  You may find that your baby is more active after eating or drinking.       We want you to time how long it takes to feel 10 movements (kicks, flutters, swishes or rolls).  You should feel at least 10 movements in 2 hours.  You will likely feel 10 movements in less than 2 hours.    If you have concerns, you can use the attached table to record movements.  Record the time you feel the first movement and the tenth movement.  This will help you observe patterns and discover how long it normally takes your baby to move 10 times.       Please call us if any of the following occurs:  You have not felt the baby move for a couple of hours  It is taking longer each day to get the tenth movement    The main point is we want you to know the characteristics of your baby, so you can tell the doctor or nurse if something different is happening.    Again, if you notice a decrease in fetal movement, please give the office a call.    If our office is closed, the answering service will page the doctor on-call.    ------------------------------      Time M T W Th F S S                                                                                                                  Time M T W Th F S S                                                                                                           Time M T W Th F S S                                                                                                           Time M T W Th F S S                                                                                                          Detail Level: None Performed By: nurse Urine Pregnancy Test Result: negative

## 2024-05-07 ENCOUNTER — TELEPHONE (OUTPATIENT)
Dept: OBGYN UNIT | Facility: HOSPITAL | Age: 29
End: 2024-05-07

## 2024-05-08 ENCOUNTER — TELEPHONE (OUTPATIENT)
Dept: OBGYN UNIT | Facility: HOSPITAL | Age: 29
End: 2024-05-08

## 2024-05-08 ENCOUNTER — ROUTINE PRENATAL (OUTPATIENT)
Dept: OBGYN CLINIC | Facility: CLINIC | Age: 29
End: 2024-05-08
Payer: COMMERCIAL

## 2024-05-08 VITALS
DIASTOLIC BLOOD PRESSURE: 66 MMHG | HEIGHT: 66 IN | HEART RATE: 83 BPM | SYSTOLIC BLOOD PRESSURE: 104 MMHG | WEIGHT: 193 LBS | BODY MASS INDEX: 31.02 KG/M2

## 2024-05-08 DIAGNOSIS — O34.219 DESIRES VBAC (VAGINAL BIRTH AFTER CESAREAN) TRIAL (HCC): ICD-10-CM

## 2024-05-08 DIAGNOSIS — Z34.80 SUPERVISION OF OTHER NORMAL PREGNANCY, ANTEPARTUM (HCC): Primary | ICD-10-CM

## 2024-05-08 PROCEDURE — 3008F BODY MASS INDEX DOCD: CPT | Performed by: OBSTETRICS & GYNECOLOGY

## 2024-05-08 PROCEDURE — 3074F SYST BP LT 130 MM HG: CPT | Performed by: OBSTETRICS & GYNECOLOGY

## 2024-05-08 PROCEDURE — 3078F DIAST BP <80 MM HG: CPT | Performed by: OBSTETRICS & GYNECOLOGY

## 2024-05-08 NOTE — PROGRESS NOTES
39w0d seen for routine OB visit.   Denies ctx, lof, vb. Reports good FM.    Patient Active Problem List   Diagnosis    History of  delivery    History of prior pregnancy with small for gestational age     Bacteriuria during pregnancy ([facility])    Supervision of other normal pregnancy, antepartum ([facility])    Choroid plexus cysts-RESOLVED    Abnormal fetal ultrasound    Encounter for supervision of normal pregnancy, antepartum ([facility])    Pelvic pain affecting pregnancy, antepartum ([facility])    Desires  (vaginal birth after ) trial ([facility])        TW lb (22.2 kg)   Depression Scale Total: 0 (2024 11:25 AM)      Gen: AAOx3, NAD  Resp: breathing comfortably  Abdomen: gravid, soft, nontender  Ext: nontender, no edema  SVE: /hi    Plan:  - repeat CS , strongly desires TOLAC, would be amenable to IOL. Risks reviewed. Will look into scheduling IOL instead of CS.   - GBS neg  - return precautions reviewed    RTO in 1 week(s).       Chaperone offered and declined.

## 2024-05-15 ENCOUNTER — ROUTINE PRENATAL (OUTPATIENT)
Dept: OBGYN CLINIC | Facility: CLINIC | Age: 29
End: 2024-05-15

## 2024-05-15 ENCOUNTER — APPOINTMENT (OUTPATIENT)
Dept: OBGYN CLINIC | Facility: CLINIC | Age: 29
End: 2024-05-15

## 2024-05-15 VITALS
HEART RATE: 90 BPM | SYSTOLIC BLOOD PRESSURE: 108 MMHG | BODY MASS INDEX: 31 KG/M2 | DIASTOLIC BLOOD PRESSURE: 62 MMHG | WEIGHT: 194.13 LBS

## 2024-05-15 DIAGNOSIS — Z3A.40 40 WEEKS GESTATION OF PREGNANCY (HCC): Primary | ICD-10-CM

## 2024-05-15 PROCEDURE — 59025 FETAL NON-STRESS TEST: CPT | Performed by: STUDENT IN AN ORGANIZED HEALTH CARE EDUCATION/TRAINING PROGRAM

## 2024-05-15 PROCEDURE — 3074F SYST BP LT 130 MM HG: CPT | Performed by: STUDENT IN AN ORGANIZED HEALTH CARE EDUCATION/TRAINING PROGRAM

## 2024-05-15 PROCEDURE — 3078F DIAST BP <80 MM HG: CPT | Performed by: STUDENT IN AN ORGANIZED HEALTH CARE EDUCATION/TRAINING PROGRAM

## 2024-05-15 NOTE — PROGRESS NOTES
RETURN OB 35-41 WGA      GA: 40w0d  Vitals:    05/15/24 0935   BP: 108/62   Pulse: 90   Weight: 194 lb 2 oz (88.1 kg)       Doing well, +FM  Denies LOF/VB/uctx  Mode of delivery:  Tolac  SVE 1/thick/high   GBS neg  Fetal movement count given  Labor precautions provided   Cephalic on exam    Patient Active Problem List    Diagnosis    Desires  (vaginal birth after ) trial ([facility])    Pelvic pain affecting pregnancy, antepartum ([facility])     Referred to PT and chiro      Choroid plexus cysts-RESOLVED     bilateral rigtht 0.58 cm and left 0.40 cm   [ x] MFM  [x] F/U ultrasound at 32 weeks  - resolved      Abnormal fetal ultrasound     limited visualization of nose, lips and profile  [X ] MFM      Encounter for supervision of normal pregnancy, antepartum ([facility])    Supervision of other normal pregnancy, antepartum ([facility])    Bacteriuria during pregnancy ([facility])     < 10x6 of staph and strep.    [X ] Repeat Urine culture neg      History of prior pregnancy with small for gestational age      [id number] at 41 wks   [X ] growth scan at 32 weeks EFW 2082 g ( 4 lb 9 oz); 44%.       History of  delivery     PLTCS 2/2 Fetal intolerance to labor 10/15/21.  Considering TOLAC with scheduled CS around due date   RCS 24  [x ] TOLAC consent form              RTC 1 week    NST  reactive and reassuring           Note to patient and family   The  [place] Cures Act makes medical notes available to patients in the interest of transparency.  However, please be advised that this is a medical document.  It is intended as [id number] communication.  It is written and medical language may contain abbreviations or verbiage that are technical and unfamiliar.  It may appear blunt or direct.  Medical documents are intended to carry relevant information, facts as evident, and the clinical opinion of the practitioner.    Facundo Maravilla MD

## 2024-05-17 ENCOUNTER — HOSPITAL ENCOUNTER (INPATIENT)
Facility: HOSPITAL | Age: 29
LOS: 2 days | Discharge: HOME | End: 2024-05-19
Attending: OBSTETRICS & GYNECOLOGY | Admitting: OBSTETRICS & GYNECOLOGY

## 2024-05-17 ENCOUNTER — ANESTHESIA (OUTPATIENT)
Dept: OBGYN UNIT | Facility: HOSPITAL | Age: 29
End: 2024-05-17

## 2024-05-17 ENCOUNTER — APPOINTMENT (OUTPATIENT)
Dept: OBGYN CLINIC | Facility: HOSPITAL | Age: 29
End: 2024-05-17

## 2024-05-17 ENCOUNTER — ANESTHESIA EVENT (OUTPATIENT)
Dept: OBGYN UNIT | Facility: HOSPITAL | Age: 29
End: 2024-05-17

## 2024-05-17 ENCOUNTER — ANESTHESIA (OUTPATIENT)
Dept: OBGYN CLINIC | Facility: HOSPITAL | Age: 29
End: 2024-05-17

## 2024-05-17 PROBLEM — O35.03X0 CHOROID PLEXUS CYSTS, FETAL, AFFECTING CARE OF MOTHER, ANTEPARTUM (HCC): Status: RESOLVED | Noted: 2024-01-11 | Resolved: 2024-05-17

## 2024-05-17 PROBLEM — O28.3 ABNORMAL FETAL ULTRASOUND: Status: RESOLVED | Noted: 2024-01-11 | Resolved: 2024-05-17

## 2024-05-17 PROBLEM — Z34.90 PREGNANCY (HCC): Status: ACTIVE | Noted: 2024-05-17

## 2024-05-17 LAB
ANTIBODY SCREEN: NEGATIVE
BASOPHILS # BLD AUTO: 0.03 X10(3) UL (ref 0–0.2)
BASOPHILS NFR BLD AUTO: 0.2 %
EOSINOPHIL # BLD AUTO: 0.05 X10(3) UL (ref 0–0.7)
EOSINOPHIL NFR BLD AUTO: 0.4 %
ERYTHROCYTE [DISTWIDTH] IN BLOOD BY AUTOMATED COUNT: 13.2 %
HCT VFR BLD AUTO: 37.8 %
HGB BLD-MCNC: 12.4 G/DL
IMM GRANULOCYTES # BLD AUTO: 0.08 X10(3) UL (ref 0–1)
IMM GRANULOCYTES NFR BLD: 0.6 %
LYMPHOCYTES # BLD AUTO: 2.62 X10(3) UL (ref 1–4)
LYMPHOCYTES NFR BLD AUTO: 21.1 %
MCH RBC QN AUTO: 29.5 PG (ref 26–34)
MCHC RBC AUTO-ENTMCNC: 32.8 G/DL (ref 31–37)
MCV RBC AUTO: 90 FL
MONOCYTES # BLD AUTO: 0.89 X10(3) UL (ref 0.1–1)
MONOCYTES NFR BLD AUTO: 7.2 %
NEUTROPHILS # BLD AUTO: 8.74 X10 (3) UL (ref 1.5–7.7)
NEUTROPHILS # BLD AUTO: 8.74 X10(3) UL (ref 1.5–7.7)
NEUTROPHILS NFR BLD AUTO: 70.5 %
PLATELET # BLD AUTO: 202 10(3)UL (ref 150–450)
RBC # BLD AUTO: 4.2 X10(6)UL
RH BLOOD TYPE: POSITIVE
T PALLIDUM AB SER QL IA: NONREACTIVE
WBC # BLD AUTO: 12.4 X10(3) UL (ref 4–11)

## 2024-05-17 PROCEDURE — 3E033VJ INTRODUCTION OF OTHER HORMONE INTO PERIPHERAL VEIN, PERCUTANEOUS APPROACH: ICD-10-PCS | Performed by: OBSTETRICS & GYNECOLOGY

## 2024-05-17 RX ORDER — BUPIVACAINE HCL/0.9 % NACL/PF 0.25 %
5 PLASTIC BAG, INJECTION (ML) EPIDURAL AS NEEDED
Status: DISCONTINUED | OUTPATIENT
Start: 2024-05-17 | End: 2024-05-18

## 2024-05-17 RX ORDER — DEXTROSE, SODIUM CHLORIDE, SODIUM LACTATE, POTASSIUM CHLORIDE, AND CALCIUM CHLORIDE 5; .6; .31; .03; .02 G/100ML; G/100ML; G/100ML; G/100ML; G/100ML
INJECTION, SOLUTION INTRAVENOUS AS NEEDED
Status: DISCONTINUED | OUTPATIENT
Start: 2024-05-17 | End: 2024-05-18 | Stop reason: HOSPADM

## 2024-05-17 RX ORDER — TERBUTALINE SULFATE 1 MG/ML
0.25 INJECTION, SOLUTION SUBCUTANEOUS AS NEEDED
Status: DISCONTINUED | OUTPATIENT
Start: 2024-05-17 | End: 2024-05-18 | Stop reason: HOSPADM

## 2024-05-17 RX ORDER — CITRIC ACID/SODIUM CITRATE 334-500MG
30 SOLUTION, ORAL ORAL AS NEEDED
Status: DISCONTINUED | OUTPATIENT
Start: 2024-05-17 | End: 2024-05-18 | Stop reason: HOSPADM

## 2024-05-17 RX ORDER — ONDANSETRON 2 MG/ML
4 INJECTION INTRAMUSCULAR; INTRAVENOUS EVERY 6 HOURS PRN
Status: DISCONTINUED | OUTPATIENT
Start: 2024-05-17 | End: 2024-05-18 | Stop reason: HOSPADM

## 2024-05-17 RX ORDER — ACETAMINOPHEN 500 MG
500 TABLET ORAL EVERY 6 HOURS PRN
Status: DISCONTINUED | OUTPATIENT
Start: 2024-05-17 | End: 2024-05-18 | Stop reason: HOSPADM

## 2024-05-17 RX ORDER — ACETAMINOPHEN 500 MG
1000 TABLET ORAL EVERY 6 HOURS PRN
Status: DISCONTINUED | OUTPATIENT
Start: 2024-05-17 | End: 2024-05-18 | Stop reason: HOSPADM

## 2024-05-17 RX ORDER — NALBUPHINE HYDROCHLORIDE 10 MG/ML
2.5 INJECTION, SOLUTION INTRAMUSCULAR; INTRAVENOUS; SUBCUTANEOUS
Status: DISCONTINUED | OUTPATIENT
Start: 2024-05-17 | End: 2024-05-18

## 2024-05-17 RX ORDER — IBUPROFEN 600 MG/1
600 TABLET ORAL ONCE AS NEEDED
Status: DISCONTINUED | OUTPATIENT
Start: 2024-05-17 | End: 2024-05-18 | Stop reason: HOSPADM

## 2024-05-17 RX ORDER — SODIUM CHLORIDE, SODIUM LACTATE, POTASSIUM CHLORIDE, CALCIUM CHLORIDE 600; 310; 30; 20 MG/100ML; MG/100ML; MG/100ML; MG/100ML
INJECTION, SOLUTION INTRAVENOUS CONTINUOUS
Status: DISCONTINUED | OUTPATIENT
Start: 2024-05-17 | End: 2024-05-18 | Stop reason: HOSPADM

## 2024-05-17 NOTE — PLAN OF CARE
Problem: BIRTH - VAGINAL/ SECTION  Goal: Fetal and maternal status remain reassuring during the birth process  Description: INTERVENTIONS:  - Monitor vital signs  - Monitor fetal heart rate  - Monitor uterine activity  - Monitor labor progression (vaginal delivery)  - DVT prophylaxis (C/S delivery)  - Surgical antibiotic prophylaxis (C/S delivery)  Outcome: Progressing     Problem: PAIN - ADULT  Goal: Verbalizes/displays adequate comfort level or patient's stated pain goal  Description: INTERVENTIONS:  - Encourage pt to monitor pain and request assistance  - Assess pain using appropriate pain scale  - Administer analgesics based on type and severity of pain and evaluate response  - Implement non-pharmacological measures as appropriate and evaluate response  - Consider cultural and social influences on pain and pain management  - Manage/alleviate anxiety  - Utilize distraction and/or relaxation techniques  - Monitor for opioid side effects  - Notify MD/LIP if interventions unsuccessful or patient reports new pain  - Anticipate increased pain with activity and pre-medicate as appropriate  Outcome: Progressing     Problem: ANXIETY  Goal: Will report anxiety at manageable levels  Description: INTERVENTIONS:  - Administer medication as ordered  - Teach and rehearse alternative coping skills  - Provide emotional support with 1:1 interaction with staff  Outcome: Progressing     Problem: Patient/Family Goals  Goal: Patient/Family Long Term Goal  Description: Patient's Long Term Goal: safe vaginal delivery      Interventions:  -   - See additional Care Plan goals for specific interventions  Outcome: Progressing  Goal: Patient/Family Short Term Goal  Description: Patient's Short Term Goal: pain control in labor      Interventions:   -   - See additional Care Plan goals for specific interventions  Outcome: Progressing

## 2024-05-17 NOTE — PROGRESS NOTES
Pt is a 28 year old female admitted to [facility].     Chief Complaint   Patient presents with    Scheduled Induction     TOLAC      Pt is  40w2d intra-uterine pregnancy.  History obtained, consents signed. Oriented to room, staff, and plan of care.

## 2024-05-17 NOTE — PROGRESS NOTES
at 40w2d admitted for IOL as TOLAC.    Vitals:    24 1255 24 1800   BP: 119/67 117/70   Pulse: 86 75   Resp: 18    Temp: 98.1 °F (36.7 °C) 98.1 °F (36.7 °C)   TempSrc: Oral Oral         FHT: 140, moderate variability, + accels, no decels  [place]: q2-3 min    SVE: 4/60/-3    Labor progress:  - s/p cervical ripening balloon  - s/p AROM with clear fluid   - fetal wellbeing reassuring with cat 1 FHT  - pain: epidural prn    Dispo: Continue IOL, anticipate     Tiffanie Dickinson MD  [facility]  2024 6:22 PM

## 2024-05-17 NOTE — H&P
[facility]  Obstetrics and Gynecology  History & Physical    Bryanna Kelsey Patient Status:  Inpatient    1995 MRN [id number]   Location [facility] LABOR & DELIVERY Attending Tiffanie Jimenez MD   Hospital Day 0 PCP Leon Gillis MD     CC: Patient is here for IOL as TOLAC    SUBJECTIVE:    Bryanna Kelsey is a 28 year old  female at 40w2d Estimated Date of Delivery: 5/15/24 who is being admitted for induction of labor as TOLAC. Her current obstetrical history is significant for h/o CS for FITL .     SHANNAN Confirmation  LMP: Patient's last menstrual period was 09/15/2023 (exact date).  SHANNAN: 5/15/2024, by Ultrasound     OB Ultrasounds:   MFM 3/27/24:  Single IUP in cephalic presentation.    Placenta is anterior.   Cardiac activity is present at 132 bpm  EFW 2082 g ( 4 lb 9 oz); 44%.    IVAN is  11.9 cm.  MVP is 5.9 cm  BPP is 8/8.     Obstetric History:   OB History    Para Term  AB Living   2 1 1 0 0 1   SAB IAB Ectopic Multiple Live Births   0 0 0   1      # Outcome Date GA Lbr Thuan/2nd Weight Sex Type Anes PTL Lv   2 Current            1 Term 10/15/21 41w0d  6 lb 5.9 oz (2.89 kg) M Caesarean EPI N KORIN      Complications: Group B beta strep +, Late decelerations, Fetal intolerance to labor     Past Medical History: History reviewed. No pertinent past medical history.    Past Surgical History:   Past Surgical History:   Procedure Laterality Date           Family History:   Family History   Problem Relation Age of Onset    Thyroid disease Mother     Diabetes Father         Age 43 passed of heart failure    Heart Disorder Father      Social History:   Social History     Tobacco Use    Smoking status: Never    Smokeless tobacco: Never   Substance Use Topics    Alcohol use: Not Currently       Home Meds:   Medications Prior to Admission   Medication Sig Dispense Refill Last Dose    acidophilus-pectin Oral Cap Take 1 capsule by mouth daily.   Past Week    prenatal  vitamin with DHA 27-0.8-228 MG Oral Cap Take 1 capsule by mouth daily.   2024     Allergies: No Known Allergies      OBJECTIVE:    Temp:  [98.1 °F (36.7 °C)] 98.1 °F (36.7 °C)  Pulse:  [86] 86  Resp:  [18] 18  BP: (119)/(67) 119/67  There is no height or weight on file to calculate BMI.    General: AAO. NAD.   Lungs: CTAB  CV: regular rate and rhythm  Abdomen: soft, nontender, nondistended, no abnormal masses, no epigastric pain, gravid   Extremities: negative edema bilaterally, negative calf tenderness bilaterally, Partha's sign negative bilaterally   SVE: 1.5/50/-4    Reactive NST - baseline 140, moderate variability, +15x15 accels, no decels  [place] - irritability with irregular ctx    Prenatal Labs Brief Review   Blood Type:   Lab Results   Component Value Date    ABO O 2023    RH Positive 2023     Lab Results   Component Value Date    GBS Negative 04/15/2024          Inpatient labs:  Lab Results   Component Value Date    WBC 12.4 2024    HGB 12.4 2024    HCT 37.8 2024    .0 2024         ASSESSMENT/ PLAN:    Bryanna Kelsey is a 28 year old  female at 40w2d Estimated Date of Delivery: 5/15/24 by LMP c/w 8wk US who is being admitted for IOL as TOLAC.    Patient Active Problem List   Diagnosis    History of  delivery    History of prior pregnancy with small for gestational age     Bacteriuria during pregnancy ([facility])    Supervision of other normal pregnancy, antepartum ([facility])    Encounter for supervision of normal pregnancy, antepartum ([facility])    Pelvic pain affecting pregnancy, antepartum ([facility])    Desires  (vaginal birth after ) trial ([facility])    Pregnancy ([facility])       1. Labor: Cook catheter CRB placed 1400, plan for AROM and/or Pitocin per protocol after removal  2. Fetal monitoring: CEFM, reactive cat 1 on admission  3. GBS: neg  4. Pain: IV meds or epidural prn    Risks/ benefits/alternatives of TOLAC discussed with pt and all questions  answered. Risks include but not limited to all risks of vaginal delivery with the addition of 1% chance of uterine rupture at uterine scar and subsequent emergency C/S.  Pt understands that there is very close monitoring of fetus during labor and low threshold to move toward surgery. Strongly encourage early epidural placement. Pt verbalizes understanding of above and wishes to proceed with SAMMY.      Risks, benefits, alternatives and possible complications have been discussed in detail with the patient.  Pre-admission, admission, and post admission procedures and expectations were discussed in detail.  All questions answered, all appropriate consents will be signed at the Hospital. Admission is planned for today.     Admit to L&D, anticipate .    Tiffanie Dickinson MD  [facility]  2024 1:15 PM

## 2024-05-18 PROBLEM — O34.219 VBAC (VAGINAL BIRTH AFTER CESAREAN) (HCC): Status: ACTIVE | Noted: 2024-05-18

## 2024-05-18 PROCEDURE — 0KQM0ZZ REPAIR PERINEUM MUSCLE, OPEN APPROACH: ICD-10-PCS | Performed by: OBSTETRICS & GYNECOLOGY

## 2024-05-18 RX ORDER — ACETAMINOPHEN 500 MG
1000 TABLET ORAL EVERY 6 HOURS PRN
Status: DISCONTINUED | OUTPATIENT
Start: 2024-05-18 | End: 2024-05-19

## 2024-05-18 RX ORDER — OXYCODONE HYDROCHLORIDE 5 MG/1
5 TABLET ORAL EVERY 6 HOURS PRN
Status: DISCONTINUED | OUTPATIENT
Start: 2024-05-18 | End: 2024-05-19

## 2024-05-18 RX ORDER — DOCUSATE SODIUM 100 MG/1
100 CAPSULE, LIQUID FILLED ORAL
Status: DISCONTINUED | OUTPATIENT
Start: 2024-05-18 | End: 2024-05-19

## 2024-05-18 RX ORDER — CHOLECALCIFEROL (VITAMIN D3) 25 MCG
1 TABLET,CHEWABLE ORAL DAILY
Status: DISCONTINUED | OUTPATIENT
Start: 2024-05-18 | End: 2024-05-19

## 2024-05-18 RX ORDER — IBUPROFEN 600 MG/1
600 TABLET ORAL EVERY 6 HOURS
Status: DISCONTINUED | OUTPATIENT
Start: 2024-05-18 | End: 2024-05-19

## 2024-05-18 RX ORDER — BISACODYL 10 MG
10 SUPPOSITORY, RECTAL RECTAL ONCE AS NEEDED
Status: DISCONTINUED | OUTPATIENT
Start: 2024-05-18 | End: 2024-05-19

## 2024-05-18 RX ORDER — SIMETHICONE 80 MG
80 TABLET,CHEWABLE ORAL 3 TIMES DAILY PRN
Status: DISCONTINUED | OUTPATIENT
Start: 2024-05-18 | End: 2024-05-19

## 2024-05-18 RX ORDER — ACETAMINOPHEN 500 MG
500 TABLET ORAL EVERY 6 HOURS PRN
Status: DISCONTINUED | OUTPATIENT
Start: 2024-05-18 | End: 2024-05-19

## 2024-05-18 NOTE — PLAN OF CARE
Problem: SAFETY ADULT - FALL  Goal: Free from fall injury  Description: INTERVENTIONS:  - Assess pt frequently for physical needs  - Identify cognitive and physical deficits and behaviors that affect risk of falls.  - [place] fall precautions as indicated by assessment.  - Educate pt/family on patient safety including physical limitations  - Instruct pt to call for assistance with activity based on assessment  - Modify environment to reduce risk of injury  - Provide assistive devices as appropriate  - Consider OT/PT consult to assist with strengthening/mobility  - Encourage toileting schedule  Reactivated     Problem: POSTPARTUM  Goal: Long Term Goal:Experiences normal postpartum course  Description: INTERVENTIONS:  - Assess and monitor vital signs and lab values.  - Assess fundus and lochia.  - Provide ice/sitz baths for perineum discomfort.  - Monitor healing of incision/episiotomy/laceration, and assess for signs and symptoms of infection and hematoma.  - Assess bladder function and monitor for bladder distention.  - Provide/instruct/assist with pericare as needed.  - Provide VTE prophylaxis as needed.  - Monitor bowel function.  - Encourage ambulation and provide assistance as needed.  - Assess and monitor emotional status and provide social service/psych resources as needed.  - Utilize standard precautions and use personal protective equipment as indicated. Ensure aseptic care of all intravenous lines and invasive tubes/drains.  - Obtain immunization and exposure to communicable diseases history.  Reactivated  Goal: Optimize infant feeding at the breast  Description: INTERVENTIONS:  - Initiate breast feeding within first hour after birth.   - Monitor effectiveness of current breast feeding efforts.  - Assess support systems available to mother/family.  - Identify cultural beliefs/practices regarding lactation, letdown techniques, maternal food preferences.  - Assess mother's knowledge and previous  experience with breast feeding.  - Provide information as needed about early infant feeding cues (e.g., rooting, lip smacking, sucking fingers/hand) versus late cue of crying.  - Discuss/demonstrate breast feeding aids (e.g., infant sling, nursing footstool/pillows, and breast pumps).  - Encourage mother/other family members to express feelings/concerns, and actively listen.  - Educate father/SO about benefits of breast feeding and how to manage common lactation challenges.  - Recommend avoidance of specific medications or substances incompatible with breast feeding.  - Assess and monitor for signs of nipple pain/trauma.  - Instruct and provide assistance with proper latch.  - Review techniques for milk expression (breast pumping) and storage of breast milk. Provide pumping equipment/supplies, instructions and assistance, as needed.  - Encourage rooming-in and breast feeding on demand.  - Encourage skin-to-skin contact.  - Provide LC support as needed.  - Assess for and manage engorgement.  - Provide breast feeding education handouts and information on community breast feeding support.   Reactivated  Goal: Establishment of adequate milk supply with medication/procedure interruptions  Description: INTERVENTIONS:  - Review techniques for milk expression (breast pumping).   - Provide pumping equipment/supplies, instructions, and assistance until it is safe to breastfeed infant.  Reactivated  Goal: Experiences normal breast weaning course  Description: INTERVENTIONS:  - Assess for and manage engorgement.  - Instruct on breast care.  - Provide comfort measures.  Reactivated  Goal: Appropriate maternal -  bonding  Description: INTERVENTIONS:  - Assess caregiver- interactions.  - Assess caregiver's emotional status and coping mechanisms.  - Encourage caregiver to participate in  daily care.  - Assess support systems available to mother/family.  - Provide /case management support as  needed.  Reactivated     Problem: GENITOURINARY - ADULT  Goal: Absence of urinary retention  Description: INTERVENTIONS:  - Assess patient’s ability to void and empty bladder  - Monitor intake/output and perform bladder scan as needed  - Follow urinary retention protocol/standard of care  - Consider collaborating with pharmacy to review patient's medication profile  - Implement strategies to promote bladder emptying  Reactivated

## 2024-05-18 NOTE — PLAN OF CARE
Problem: BIRTH - VAGINAL/ SECTION  Goal: Fetal and maternal status remain reassuring during the birth process  Description: INTERVENTIONS:  - Monitor vital signs  - Monitor fetal heart rate  - Monitor uterine activity  - Monitor labor progression (vaginal delivery)  - DVT prophylaxis (C/S delivery)  - Surgical antibiotic prophylaxis (C/S delivery)  2024 by Adriana Garcia RN  Outcome: Completed  2024 by Adriana Garcia RN  Outcome: Progressing     Problem: PAIN - ADULT  Goal: Verbalizes/displays adequate comfort level or patient's stated pain goal  Description: INTERVENTIONS:  - Encourage pt to monitor pain and request assistance  - Assess pain using appropriate pain scale  - Administer analgesics based on type and severity of pain and evaluate response  - Implement non-pharmacological measures as appropriate and evaluate response  - Consider cultural and social influences on pain and pain management  - Manage/alleviate anxiety  - Utilize distraction and/or relaxation techniques  - Monitor for opioid side effects  - Notify MD/LIP if interventions unsuccessful or patient reports new pain  - Anticipate increased pain with activity and pre-medicate as appropriate  2024 by Adriana Garcia RN  Outcome: Completed  2024 by Adriana Garcia RN  Outcome: Progressing     Problem: ANXIETY  Goal: Will report anxiety at manageable levels  Description: INTERVENTIONS:  - Administer medication as ordered  - Teach and rehearse alternative coping skills  - Provide emotional support with 1:1 interaction with staff  2024 by Adriana Garcia RN  Outcome: Completed  2024 by Adriana Garcia RN  Outcome: Progressing     Problem: Patient/Family Goals  Goal: Patient/Family Long Term Goal  Description: Patient's Long Term Goal: safe vaginal delivery      Interventions:  -   - See additional Care Plan goals for specific interventions  2024 by Adriana Garcia  RN  Outcome: Completed  5/17/2024 1919 by Adriana Garcia, RN  Outcome: Progressing  Goal: Patient/Family Short Term Goal  Description: Patient's Short Term Goal: pain control in labor      Interventions:   -   - See additional Care Plan goals for specific interventions  5/18/2024 0702 by Adriana Garcia, RN  Outcome: Completed  5/17/2024 1919 by Adriana Garcia, RN  Outcome: Progressing

## 2024-05-18 NOTE — ANESTHESIA PROCEDURE NOTES
Labor Analgesia    Date/Time: 5/17/2024 9:18 PM    Performed by: Chidi Swanson MD  Authorized by: Chidi Swanson MD      General Information and Staff    Start Time:  5/17/2024 9:18 PM  End Time:  5/17/2024 9:26 PM  Anesthesiologist:  Chidi Swanson MD  Performed by:  Anesthesiologist  Patient Location:  OB  Site Identification: surface landmarks    Reason for Block: labor epidural    Preanesthetic Checklist: patient identified, IV checked, risks and benefits discussed, monitors and equipment checked, pre-op evaluation, timeout performed, IV bolus, anesthesia consent and sterile technique used      Procedure Details    Patient Position:  Sitting  Prep: ChloraPrep    Monitoring:  Heart rate and continuous pulse ox  Approach:  Midline    Epidural Needle    Injection Technique:  MONIQUE air  Needle Type:  Tuohy  Needle Gauge:  17 G  Needle Length:  3.375 in  Needle Insertion Depth:  5  Location:  L4-5    Spinal Needle      Catheter    Catheter Type:  End hole  Catheter Size:  19 G  Catheter at Skin Depth:  10  Test Dose:  Negative    Assessment    Sensory Level:  T10    Additional Comments       Patient in sitting position, assisted by RN. Hands sterilized with alcohol gel. Sterile gloves, mask, and hat worn. Pt prepped with chloroprene and sterile drape applied. +MONIQUE to air and epidural catheter threaded easily and secured to patients' back. Patient denied any paraesthesias, no francois blood or CSF aspirated; negative test dose; epidural dosed without significant hypotension; no immediate complications observed. Patient tolerated procedure well. Pt reports improvement in pain with contractions.     Test Dose: lidocaine 1.5% with epinephrine 1:200,000 - 5mL    Epidural bolused with: bupivacaine 0.25% 5mL and fentanyl 100 mcg    Fentanyl 2 mcg/mL + Bupivacaine 0.125% epidural infusion set at 10cc/hr

## 2024-05-18 NOTE — ANESTHESIA PREPROCEDURE EVALUATION
PRE-OP EVALUATION    Patient Name: Bryanna Kelsey    Admit Diagnosis: Pregnancy (HCC)    Pre-op Diagnosis: * No pre-op diagnosis entered *        Anesthesia Procedure: LABOR ANALGESIA    * No surgeons found in log *    Pre-op vitals reviewed.  Temp: 98 °F (36.7 °C)  Pulse: 75  Resp: 18  BP: 117/70     There is no height or weight on file to calculate BMI.    Current medications reviewed.  Hospital Medications:   lactated ringers infusion   Intravenous Continuous    dextrose in lactated ringers 5% infusion   Intravenous PRN    lactated ringers IV bolus 500 mL  500 mL Intravenous PRN    acetaminophen (Tylenol Extra Strength) tab 500 mg  500 mg Oral Q6H PRN    acetaminophen (Tylenol Extra Strength) tab 1,000 mg  1,000 mg Oral Q6H PRN    ibuprofen (Motrin) tab 600 mg  600 mg Oral Once PRN    ondansetron (Zofran) 4 MG/2ML injection 4 mg  4 mg Intravenous Q6H PRN    oxyTOCIN in sodium chloride 0.9% (Pitocin) 30 Units/500mL infusion premix  62.5-900 daniel-units/min Intravenous Continuous    terbutaline (Brethine) 1 MG/ML injection 0.25 mg  0.25 mg Subcutaneous PRN    sodium citrate-citric acid (Bicitra) 500-334 MG/5ML oral solution 30 mL  30 mL Oral PRN    lactated ringers IV bolus 1,000 mL  1,000 mL Intravenous Once    fentaNYL-bupivacaine 2 mcg/mL-0.125% in sodium chloride 0.9% 100 mL EPIDURAL infusion premix  12 mL/hr Epidural Continuous    fentaNYL (Sublimaze) 50 mcg/mL injection 100 mcg  100 mcg Epidural Once    EPHEDrine (PF) 25 MG/5 ML injection 5 mg  5 mg Intravenous PRN    nalbuphine (Nubain) 10 mg/mL injection 2.5 mg  2.5 mg Intravenous Q15 Min PRN    oxyTOCIN in sodium chloride 0.9% (Pitocin) 30 Units/500mL infusion premix  0.5-20 daniel-units/min Intravenous Continuous       Outpatient Medications:     Medications Prior to Admission   Medication Sig Dispense Refill Last Dose    acidophilus-pectin Oral Cap Take 1 capsule by mouth daily.   Past Week    prenatal vitamin with DHA 27-0.8-228 MG Oral Cap Take 1  capsule by mouth daily.   2024       Allergies: Patient has no known allergies.      Anesthesia Evaluation    Patient summary reviewed.    Anesthetic Complications  (-) history of anesthetic complications         GI/Hepatic/Renal      (-) GERD                           Cardiovascular        Exercise tolerance: good     MET: >4    (-) obesity  (-) hypertension     (-) CAD                  (-) angina     (-) LEMUS         Endo/Other      (-) diabetes                            Pulmonary      (-) asthma  (-) COPD       (-) shortness of breath     (-) sleep apnea       Neuro/Psych                              Patient Active Problem List:     History of  delivery     History of prior pregnancy with small for gestational age      Bacteriuria during pregnancy ([facility])     Supervision of other normal pregnancy, antepartum ([facility])     Encounter for supervision of normal pregnancy, antepartum ([facility])     Pelvic pain affecting pregnancy, antepartum ([facility])     Desires  (vaginal birth after ) trial ([facility])     Pregnancy ([facility])            Past Surgical History:   Procedure Laterality Date           Social History     Socioeconomic History    Marital status:    Tobacco Use    Smoking status: Never    Smokeless tobacco: Never   Vaping Use    Vaping status: Never Used   Substance and Sexual Activity    Alcohol use: Not Currently    Drug use: Never    Sexual activity: Yes     Partners: Male   Other Topics Concern    Caffeine Concern No    Exercise No    Seat Belt Yes     History   Drug Use Unknown     Available pre-op labs reviewed.  Lab Results   Component Value Date    WBC 12.4 (H) 2024    RBC 4.20 2024    HGB 12.4 2024    HCT 37.8 2024    MCV 90.0 2024    MCH 29.5 2024    MCHC 32.8 2024    RDW 13.2 2024    .0 2024               Airway      Mallampati: II  Mouth opening: >3 FB  TM distance: 4 - 6 cm  Neck ROM: full  Cardiovascular    Cardiovascular exam normal.  Rhythm: regular  Rate: normal     Dental  Comment: Patient denies any loose/missing/cracked teeth. No gross abnormalities or loose teeth noted on exam.      Dentition appears grossly intact         Pulmonary    Pulmonary exam normal.                 Other findings              ASA: 2   Plan: epidural  NPO status verified and patient meets guidelines.        Comment:     Benefits of epidural analgesia for labor pain were discussed with Bryanna Rhoadesvaleria, including significant pain relief during labor, and gateway for quick neuraxial anesthesia in case of urgent . Realistic expectations were also discussed including necessity for additional dosing, or replacement of catheter if coverage is inadequate; nausea/vomiting, pruritus, PPDH, as well as other serious but rare complications including infection, epidural hematoma, nerve/cord injury. All questions were answered appropriately and patient demonstrated understanding of realistic expectations and risks of procedure. Bryanna Kelsey consents to receiving labor epidural and wishes to proceed.  Plan/risks discussed with: patient                Present on Admission:  **None**

## 2024-05-18 NOTE — PROGRESS NOTES
Patient transferred to mother baby unit. Report given to Liliam GONZALEZ mother baby nurse. Id bands checked with mother baby nurse.

## 2024-05-18 NOTE — PROGRESS NOTES
at 40w2d admitted for IOL as TOLAC.    Vitals:    24 2205 24 2206 24 2215 24 2225   BP: (!) 87/61 103/71 105/59 108/61   Pulse: 97 80 69 81   Resp:       Temp:       TempSrc:             FHT: 125, moderate variability, + accels, no decels  [place]: q2-4 min    SVE: 4/60/-3    Labor progress:  - s/p cervical ripening balloon  - s/p AROM with clear fluid   - unchanged SVE, initiate Pitocin per protocol  - fetal wellbeing reassuring with cat 1 FHT  - pain: s/p epidural     Dispo: Continue IOL, anticipate     Tiffanie Dickinson MD  [facility]  2024 10:51 PM

## 2024-05-19 ENCOUNTER — ANESTHESIA EVENT (OUTPATIENT)
Dept: OBGYN CLINIC | Facility: HOSPITAL | Age: 29
End: 2024-05-19

## 2024-05-19 VITALS
DIASTOLIC BLOOD PRESSURE: 71 MMHG | OXYGEN SATURATION: 97 % | SYSTOLIC BLOOD PRESSURE: 107 MMHG | RESPIRATION RATE: 16 BRPM | HEART RATE: 78 BPM | TEMPERATURE: 98 F

## 2024-05-19 LAB
HCT VFR BLD AUTO: 30.3 %
HGB BLD-MCNC: 10.4 G/DL

## 2024-05-19 NOTE — PROGRESS NOTES
Labor Analgesia Follow Up Note    Patient underwent epidural anesthesia for labor analgesia,    Placenta Date/Time: 5/18/2024  6:37 AM     Delivery Date/Time:: 5/18/2024   6:18 AM     /71 (BP Location: Right arm)   Pulse 78   Temp 97.7 °F (36.5 °C) (Oral)   Resp 16   LMP 09/15/2023 (Exact Date)   SpO2 97%   Breastfeeding Yes     Assessment:  Patient seen and no apparent anesthesia related complications.    Thank you for asking us to participate in the care of your patient.

## 2024-05-19 NOTE — PROGRESS NOTES
Patient complaining of pain in her upper abdomen when she urinates.  I explained the mechanism of the uterus frandy as she releases her bladder, as well as the possibility of her straining a muscle during delivery.  Notified the Doctor on call to make them aware.  No further orders.

## 2024-05-19 NOTE — PLAN OF CARE
Problem: SAFETY ADULT - FALL  Goal: Free from fall injury  Description: INTERVENTIONS:  - Assess pt frequently for physical needs  - Identify cognitive and physical deficits and behaviors that affect risk of falls.  - [place] fall precautions as indicated by assessment.  - Educate pt/family on patient safety including physical limitations  - Instruct pt to call for assistance with activity based on assessment  - Modify environment to reduce risk of injury  - Provide assistive devices as appropriate  - Consider OT/PT consult to assist with strengthening/mobility  - Encourage toileting schedule  Outcome: Completed     Problem: POSTPARTUM  Goal: Long Term Goal:Experiences normal postpartum course  Description: INTERVENTIONS:  - Assess and monitor vital signs and lab values.  - Assess fundus and lochia.  - Provide ice/sitz baths for perineum discomfort.  - Monitor healing of incision/episiotomy/laceration, and assess for signs and symptoms of infection and hematoma.  - Assess bladder function and monitor for bladder distention.  - Provide/instruct/assist with pericare as needed.  - Provide VTE prophylaxis as needed.  - Monitor bowel function.  - Encourage ambulation and provide assistance as needed.  - Assess and monitor emotional status and provide social service/psych resources as needed.  - Utilize standard precautions and use personal protective equipment as indicated. Ensure aseptic care of all intravenous lines and invasive tubes/drains.  - Obtain immunization and exposure to communicable diseases history.  Outcome: Progressing  Goal: Optimize infant feeding at the breast  Description: INTERVENTIONS:  - Initiate breast feeding within first hour after birth.   - Monitor effectiveness of current breast feeding efforts.  - Assess support systems available to mother/family.  - Identify cultural beliefs/practices regarding lactation, letdown techniques, maternal food preferences.  - Assess mother's knowledge and  previous experience with breast feeding.  - Provide information as needed about early infant feeding cues (e.g., rooting, lip smacking, sucking fingers/hand) versus late cue of crying.  - Discuss/demonstrate breast feeding aids (e.g., infant sling, nursing footstool/pillows, and breast pumps).  - Encourage mother/other family members to express feelings/concerns, and actively listen.  - Educate father/SO about benefits of breast feeding and how to manage common lactation challenges.  - Recommend avoidance of specific medications or substances incompatible with breast feeding.  - Assess and monitor for signs of nipple pain/trauma.  - Instruct and provide assistance with proper latch.  - Review techniques for milk expression (breast pumping) and storage of breast milk. Provide pumping equipment/supplies, instructions and assistance, as needed.  - Encourage rooming-in and breast feeding on demand.  - Encourage skin-to-skin contact.  - Provide LC support as needed.  - Assess for and manage engorgement.  - Provide breast feeding education handouts and information on community breast feeding support.   Outcome: Progressing  Goal: Establishment of adequate milk supply with medication/procedure interruptions  Description: INTERVENTIONS:  - Review techniques for milk expression (breast pumping).   - Provide pumping equipment/supplies, instructions, and assistance until it is safe to breastfeed infant.  Outcome: Progressing  Goal: Appropriate maternal -  bonding  Description: INTERVENTIONS:  - Assess caregiver- interactions.  - Assess caregiver's emotional status and coping mechanisms.  - Encourage caregiver to participate in  daily care.  - Assess support systems available to mother/family.  - Provide /case management support as needed.  Outcome: Progressing     Problem: GENITOURINARY - ADULT  Goal: Absence of urinary retention  Description: INTERVENTIONS:  - Assess patient’s ability to  void and empty bladder  - Monitor intake/output and perform bladder scan as needed  - Follow urinary retention protocol/standard of care  - Consider collaborating with pharmacy to review patient's medication profile  - Implement strategies to promote bladder emptying  Outcome: Completed

## 2024-05-19 NOTE — PROGRESS NOTES
PPD#1  Patient has no complaints, would like to go home today    /71 (BP Location: Right arm)   Pulse 78   Temp 97.7 °F (36.5 °C) (Oral)   Resp 16   LMP 09/15/2023 (Exact Date)   SpO2 97%   Breastfeeding Yes     Recent Labs   Lab 24  1236   RBC 4.20   HGB 12.4   HCT 37.8   MCV 90.0   MCH 29.5   MCHC 32.8   RDW 13.2   NEPRELIM 8.74*   WBC 12.4*   .0       Abdomen: soft, NT/ND  Uterus: firm, below umbilicus    A/P: s/p   - doing good  - home today

## 2024-05-21 ENCOUNTER — TELEPHONE (OUTPATIENT)
Dept: OBGYN UNIT | Facility: HOSPITAL | Age: 29
End: 2024-05-21

## 2024-05-22 ENCOUNTER — TELEPHONE (OUTPATIENT)
Dept: OBGYN UNIT | Facility: HOSPITAL | Age: 29
End: 2024-05-22

## 2024-05-22 NOTE — L&D DELIVERY NOTE
Jayant Kelsey [[id number]]      Labor Events     labor?: No   steroids?: None  Antibiotics received during labor?: No  Rupture date/time: 2024     Rupture type: AROM  Fluid color: Clear  Labor type: Induced Onset of Labor  Induction: Cervical Ripening Balloon, AROM, Oxytocin  Indications for induction: Other - comment  Induction comment: Trial of labor   Intrapartum & labor complications: None       Labor Length    1st stage: 11h 48m  2nd stage: 0h 18m  3rd stage: 0h 18m       Labor Event Times    Labor onset date/time: 2024  Dilation complete date/time: 2024 0600  Start pushing date/time: 2024 0604       [place] Presentation    Presentation: Vertex  Position: Right Occiput Anterior       Operative Delivery    Operative Vaginal Delivery: N/A                Shoulder Dystocia    Shoulder Dystocia: N/A       Anesthesia    Method: Epidural               Delivery      Head delivery date/time: 2024 06:18:10   Delivery date/time:  24 06:18:16   Delivery type: Vaginal birth after caesarian    Details:     Delivery location: delivery room       Delivery Providers    Delivering Clinician: Tiffanie Jimenez MD   Delivery personnel:  Provider Role   Jeana Pickens RN Baby Nurse   Adriana Garcia RN Delivery Nurse             Cord    Vessels: 3 Vessels  Complications: None  Timed cord clamping: Yes  Time in sec: 60  Cord blood disposition: to lab  Gases sent?: No       Resuscitation    Method: None       [place] Measurements      Weight: 3320 g 7 lb 5.1 oz Length: 50.8 cm     Head circum.: 35 cm Chest circum.: 35 cm      Abdominal circum.: 34.5 cm           Placenta    Date/time: 2024 0637  Removal: Spontaneous  Appearance: Intact  Disposition: held for future pathology       Apgars    Living status: Living   Apgar Scoring Key:    0 1 2    Skin color Blue or pale Acrocyanotic Completely pink    Heart rate Absent <100 bpm >100 bpm    Reflex  irritability No response Grimace Cry or active withdrawal    Muscle tone Limp Some flexion Active motion    Respiratory effort Absent Weak cry; hypoventilation Good, crying              1 Minute:  5 Minute:  10 Minute:  15 Minute:  20 Minute:      Skin color: 0  1       Heart rate: 2  2       Reflex irritablity: 2  2       Muscle tone: 2  2       Respiratory effort: 2  2       Total: 8  9          Apgars assigned by: PARISH ALVAREZ RN   disposition: with mother       Skin to Skin    Skin to skin initiated date/time: 2024 0645  Skin to skin with: Mother       Vaginal Count    Initial count RN: Adriana Garcia RN  Initial count Tech: Lanzisero, Carmen   Sheeba   Sharps    Initial counts 11   0    Final counts 11   1    Final count RN: Adriana Garcia RN  Final count MD: Tiffanie Dickinson MD       Lacerations    Episiotomy: None  Perineal lacerations: 2nd Repaired?: Yes     Vaginal laceration?: No      Cervical laceration?: No    Clitoral laceration?: No    Quantitative blood loss (mL): 315            Called by RN that patient was completely dilated with urge to push. Head was delivered over intact perineum with maternal effort in ELOISE position. No nuchal cord noted. Anterior shoulder was delivered with maternal effort and gentle downward traction. Posterior shoulder and body followed easily. Mouth and nose were bulb suctioned and baby was placed on maternal abdomen for drying/stimulation. Vigorous cry. Cord was clamped x2 after delay and cut by FOB.  Cord blood collected. Placenta delivered complete with gentle cord traction. Uterus firm with massage, IV Pitocin given, minimal bleeding. Vagina/cervix examined and found to have 2nd degree laceration repaired with 3-0 Vicryl with excellent hemostasis and aesthetic result.   Counts were correct at the end of the delivery. QBL 315cc.  Mother and baby \"Saba\" doing well, plan for transfer to Mother/Baby after initial recovery.     Tiffanie Dickinson  MD  [facility]

## 2024-05-28 ENCOUNTER — PATIENT OUTREACH (OUTPATIENT)
Dept: CASE MANAGEMENT | Age: 29
End: 2024-05-28

## 2024-05-28 NOTE — PROGRESS NOTES
OBGYN Post Partum apt request (delivered 05/18)    Dr Yadi Dickinson  [facility]  [address] [place] [id number] [phone]  Follow up 6 weeks  LVM to call [phone] to assist w/scheduling apt

## 2024-05-29 NOTE — PROGRESS NOTES
OBGYN Post Partum apt request (delivered 05/18)     Dr Yadi Dickinson  [facility]  [address] [place] [id number] [phone]  Follow up 6 weeks  Multiple attempts wi/no calls back; no apt made  Closing encounter

## 2024-06-05 ENCOUNTER — TELEPHONE (OUTPATIENT)
Dept: OBGYN CLINIC | Facility: CLINIC | Age: 29
End: 2024-06-05

## 2024-06-06 ENCOUNTER — OFFICE VISIT (OUTPATIENT)
Dept: OBGYN CLINIC | Facility: CLINIC | Age: 29
End: 2024-06-06
Payer: COMMERCIAL

## 2024-06-06 VITALS
WEIGHT: 175.63 LBS | HEIGHT: 66 IN | BODY MASS INDEX: 28.22 KG/M2 | SYSTOLIC BLOOD PRESSURE: 102 MMHG | DIASTOLIC BLOOD PRESSURE: 60 MMHG

## 2024-06-06 DIAGNOSIS — R10.2 PELVIC PRESSURE IN FEMALE: Primary | ICD-10-CM

## 2024-06-06 PROCEDURE — 3074F SYST BP LT 130 MM HG: CPT | Performed by: OBSTETRICS & GYNECOLOGY

## 2024-06-06 PROCEDURE — 99024 POSTOP FOLLOW-UP VISIT: CPT | Performed by: OBSTETRICS & GYNECOLOGY

## 2024-06-06 PROCEDURE — 3008F BODY MASS INDEX DOCD: CPT | Performed by: OBSTETRICS & GYNECOLOGY

## 2024-06-06 PROCEDURE — 3078F DIAST BP <80 MM HG: CPT | Performed by: OBSTETRICS & GYNECOLOGY

## 2024-06-06 NOTE — PROGRESS NOTES
[facility]  Obstetrics and Gynecology    Subjective:     Bryanna Kelsey is a 28 year old  who presents for early postpartum visit due to concern for pelvic organ prolapse. Pt delivered by  at 40w3d on 24, had 2nd degree lac repair.     Patient reports having more vaginal pressure (compared to after her ) and when she looked with a mirror she thought she could see her cervix.   She is feeling well. She denies pain, bleeding, or abnormal vaginal discharge. Tolerating PO, ambulating, and voiding spontaneously. Denies issue with bowel movements.     Objective:     Vitals:    24 1158   BP: 102/60   Weight: 175 lb 9.6 oz (79.7 kg)   Height: 66\"       Body mass index is 28.34 kg/m².    GEN: AAOx3, NAD, appears well, appears stated age  RESP: breathing comfortably  ABD: soft, NT, ND, no rebound or guarding  PELVIC:   Vulva: NEFG.   Vagina: Estrogenized, physiologic discharge. Incision healing well, no visible suture on perineum, only within right sulcal vaginal tissue, nontender.    Cervix: Not prolapsed. No lesions or tenderness.     Rectal: Anus and perineum are normal.    EXT: non tender, no edema       Assessment:     Bryanna Kelsey is a 28 year old  s/p , doing well postpartum.      Plan:     -- reassured on physical exam findings  -- encouraged continued rest/recovery, can increase activity as tolerated    -- Follow up for routine postpartum visit or sooner as needed    Tiffanie Dickinson MD  [facility]  2024 12:22 PM

## 2024-06-28 ENCOUNTER — POSTPARTUM (OUTPATIENT)
Dept: OBGYN CLINIC | Facility: CLINIC | Age: 29
End: 2024-06-28

## 2024-06-28 VITALS
DIASTOLIC BLOOD PRESSURE: 68 MMHG | HEIGHT: 66 IN | BODY MASS INDEX: 27.82 KG/M2 | SYSTOLIC BLOOD PRESSURE: 100 MMHG | HEART RATE: 80 BPM | WEIGHT: 173.13 LBS

## 2024-06-28 DIAGNOSIS — Z12.4 SCREENING FOR CERVICAL CANCER: ICD-10-CM

## 2024-06-28 PROBLEM — O26.899 PELVIC PAIN AFFECTING PREGNANCY, ANTEPARTUM (HCC): Status: RESOLVED | Noted: 2024-03-20 | Resolved: 2024-06-28

## 2024-06-28 PROBLEM — O34.219 DESIRES VBAC (VAGINAL BIRTH AFTER CESAREAN) TRIAL (HCC): Status: RESOLVED | Noted: 2024-05-08 | Resolved: 2024-06-28

## 2024-06-28 PROBLEM — Z34.80 SUPERVISION OF OTHER NORMAL PREGNANCY, ANTEPARTUM (HCC): Status: RESOLVED | Noted: 2023-12-05 | Resolved: 2024-06-28

## 2024-06-28 PROBLEM — Z34.90 PREGNANCY (HCC): Status: RESOLVED | Noted: 2024-05-17 | Resolved: 2024-06-28

## 2024-06-28 PROBLEM — R82.71 BACTERIURIA DURING PREGNANCY (HCC): Status: RESOLVED | Noted: 2023-11-09 | Resolved: 2024-06-28

## 2024-06-28 PROBLEM — O99.891 BACTERIURIA DURING PREGNANCY (HCC): Status: RESOLVED | Noted: 2023-11-09 | Resolved: 2024-06-28

## 2024-06-28 PROBLEM — Z34.90 ENCOUNTER FOR SUPERVISION OF NORMAL PREGNANCY, ANTEPARTUM (HCC): Status: RESOLVED | Noted: 2024-01-11 | Resolved: 2024-06-28

## 2024-06-28 PROBLEM — R10.2 PELVIC PAIN AFFECTING PREGNANCY, ANTEPARTUM (HCC): Status: RESOLVED | Noted: 2024-03-20 | Resolved: 2024-06-28

## 2024-06-28 PROBLEM — Z98.891 HISTORY OF VBAC: Status: ACTIVE | Noted: 2024-05-18

## 2024-06-28 PROCEDURE — 3078F DIAST BP <80 MM HG: CPT | Performed by: OBSTETRICS & GYNECOLOGY

## 2024-06-28 PROCEDURE — 3008F BODY MASS INDEX DOCD: CPT | Performed by: OBSTETRICS & GYNECOLOGY

## 2024-06-28 PROCEDURE — 88175 CYTOPATH C/V AUTO FLUID REDO: CPT | Performed by: OBSTETRICS & GYNECOLOGY

## 2024-06-28 PROCEDURE — 3074F SYST BP LT 130 MM HG: CPT | Performed by: OBSTETRICS & GYNECOLOGY

## 2024-06-28 NOTE — PROGRESS NOTES
POSTPARTUM VISIT    S: patient is a 28 year old  who presents today for post partum visit. She underwent  at 40w3d on 24, had 2nd degree lac repair.     Patient Active Problem List   Diagnosis    History of  delivery    History of prior pregnancy with small for gestational age     Bacteriuria during pregnancy ([facility])    Supervision of other normal pregnancy, antepartum ([facility])    Encounter for supervision of normal pregnancy, antepartum ([facility])    Pelvic pain affecting pregnancy, antepartum ([facility])    Desires  (vaginal birth after ) trial ([facility])    Pregnancy ([facility])     (vaginal birth after ) ([facility])        She is doing well, bonding with baby Saba. Breastfeeding. Bleeding stopped. No further vaginal concerns.  Mood: good. Denies SI/HI.  Depression Scale Total: 0 (5/15/2024  9:34 AM)      Review of Systems   Constitutional: Negative for activity change, appetite change, chills, fever and unexpected weight change.   HENT: Negative for congestion.    Respiratory: Negative for shortness of breath and wheezing.    Cardiovascular: Negative for chest pain.   Breast: denies pain or skin changes  Gastrointestinal: Negative for vomiting, abdominal pain and abdominal distention.   Genitourinary: Negative for dysuria, frequency, hematuria, vaginal discharge, difficulty urinating, genital sores, vaginal pain, pelvic pain.   Skin: Negative for wound.   Neurological: Negative for dizziness, seizures, numbness and headaches.   Psychiatric/Behavioral: Negative for suicidal ideas.       O:   Vitals:    24 1008   BP: 100/68   Pulse: 80   Weight: 173 lb 2 oz (78.5 kg)   Height: 66\"       Body mass index is 27.94 kg/m².     General:  NAD, AAOx3   Abdomen: soft, nontender, nondistended.   Ext: nontender, no edema  GYNE/:   Vulva: NEFG.                Vagina: incision well healed, normal pink mucosa, no lesions.    Cervix: parous, no lesions               Uterus: mobile, non tender,  normal size               Adnexa: non tender, no masses, normal size    Chaperone offered and declined.              A/P:  -Postpartum depression screen reassuring  -Contraception: planning vasectomy, counseled on abstinence or condoms until then, declines other contraception  -Pap smear: collected      Follow up with annual exam or sooner prn.    Tiffanie Dickinson MD  [facility]  6/28/2024 10:16 AM

## 2024-07-08 LAB
.: NORMAL
.: NORMAL

## 2024-08-12 ENCOUNTER — TELEPHONE (OUTPATIENT)
Dept: FAMILY MEDICINE CLINIC | Facility: CLINIC | Age: 29
End: 2024-08-12

## 2024-08-12 DIAGNOSIS — Z13.29 SCREENING FOR THYROID DISORDER: ICD-10-CM

## 2024-08-12 DIAGNOSIS — Z13.6 SCREENING FOR CARDIOVASCULAR CONDITION: ICD-10-CM

## 2024-08-12 DIAGNOSIS — Z79.899 LONG-TERM USE OF HIGH-RISK MEDICATION: ICD-10-CM

## 2024-08-12 DIAGNOSIS — Z13.1 SCREENING FOR DIABETES MELLITUS: Primary | ICD-10-CM

## 2024-08-12 DIAGNOSIS — Z00.00 LABORATORY EXAMINATION ORDERED AS PART OF A ROUTINE GENERAL MEDICAL EXAMINATION: ICD-10-CM

## 2024-08-12 DIAGNOSIS — Z13.0 SCREENING FOR IRON DEFICIENCY ANEMIA: ICD-10-CM

## 2024-08-12 NOTE — TELEPHONE ENCOUNTER
1. Screening for diabetes mellitus (Primary)  -     Comp Metabolic Panel (14); Future; Expected date: 08/12/2024  2. Screening for iron deficiency anemia  -     CBC With Differential With Platelet; Future; Expected date: 08/12/2024  3. Screening for thyroid disorder  -     TSH W Reflex To Free T4; Future; Expected date: 08/12/2024  4. Screening for cardiovascular condition  -     Lipid Panel; Future; Expected date: 08/12/2024  5. Long-term use of high-risk medication  -     CBC With Differential With Platelet; Future; Expected date: 08/12/2024  6. Laboratory examination ordered as part of a routine general medical examination  -     TSH W Reflex To Free T4; Future; Expected date: 08/12/2024  -     Lipid Panel; Future; Expected date: 08/12/2024  -     Comp Metabolic Panel (14); Future; Expected date: 08/12/2024  -     CBC With Differential With Platelet; Future; Expected date: 08/12/2024       OK to notify. Thanks, Marques Gillis MD

## 2024-08-12 NOTE — TELEPHONE ENCOUNTER
Please enter lab orders for the patient's upcoming physical appointment.     Physical scheduled:   Your appointments       Date & Time Appointment Department ([place])    Sep 06, 2024 11:00 AM CDT Physical - Established with Leon Gillis MD [facility] ([facility])              [facility]  [address]  [facility] [address]  [phone]           Preferred lab: [facility] ([facility])     The patient has been notified to complete fasting labs prior to their physical appointment.

## 2024-08-27 ENCOUNTER — LAB ENCOUNTER (OUTPATIENT)
Dept: LAB | Age: 29
End: 2024-08-27
Attending: FAMILY MEDICINE
Payer: COMMERCIAL

## 2024-08-27 DIAGNOSIS — Z13.29 SCREENING FOR THYROID DISORDER: ICD-10-CM

## 2024-08-27 DIAGNOSIS — Z00.00 LABORATORY EXAMINATION ORDERED AS PART OF A ROUTINE GENERAL MEDICAL EXAMINATION: ICD-10-CM

## 2024-08-27 DIAGNOSIS — Z13.0 SCREENING FOR IRON DEFICIENCY ANEMIA: ICD-10-CM

## 2024-08-27 DIAGNOSIS — Z79.899 LONG-TERM USE OF HIGH-RISK MEDICATION: ICD-10-CM

## 2024-08-27 DIAGNOSIS — Z13.1 SCREENING FOR DIABETES MELLITUS: ICD-10-CM

## 2024-08-27 DIAGNOSIS — Z13.6 SCREENING FOR CARDIOVASCULAR CONDITION: ICD-10-CM

## 2024-08-27 LAB
ALBUMIN SERPL-MCNC: 4.6 G/DL (ref 3.2–4.8)
ALBUMIN/GLOB SERPL: 1.4 {RATIO} (ref 1–2)
ALP LIVER SERPL-CCNC: 96 U/L
ALT SERPL-CCNC: 11 U/L
ANION GAP SERPL CALC-SCNC: 7 MMOL/L (ref 0–18)
AST SERPL-CCNC: 21 U/L (ref ?–34)
BASOPHILS # BLD AUTO: 0.03 X10(3) UL (ref 0–0.2)
BASOPHILS NFR BLD AUTO: 0.4 %
BILIRUB SERPL-MCNC: 0.5 MG/DL (ref 0.3–1.2)
BUN BLD-MCNC: 20 MG/DL (ref 9–23)
CALCIUM BLD-MCNC: 9.9 MG/DL (ref 8.7–10.4)
CHLORIDE SERPL-SCNC: 106 MMOL/L (ref 98–112)
CHOLEST SERPL-MCNC: 153 MG/DL (ref ?–200)
CO2 SERPL-SCNC: 27 MMOL/L (ref 21–32)
CREAT BLD-MCNC: 0.89 MG/DL
EGFRCR SERPLBLD CKD-EPI 2021: 90 ML/MIN/1.73M2 (ref 60–?)
EOSINOPHIL # BLD AUTO: 0.16 X10(3) UL (ref 0–0.7)
EOSINOPHIL NFR BLD AUTO: 2 %
ERYTHROCYTE [DISTWIDTH] IN BLOOD BY AUTOMATED COUNT: 13.4 %
FASTING PATIENT LIPID ANSWER: YES
FASTING STATUS PATIENT QL REPORTED: YES
GLOBULIN PLAS-MCNC: 3.3 G/DL (ref 2–3.5)
GLUCOSE BLD-MCNC: 81 MG/DL (ref 70–99)
HCT VFR BLD AUTO: 38.5 %
HDLC SERPL-MCNC: 67 MG/DL (ref 40–59)
HGB BLD-MCNC: 12.7 G/DL
IMM GRANULOCYTES # BLD AUTO: 0.03 X10(3) UL (ref 0–1)
IMM GRANULOCYTES NFR BLD: 0.4 %
LDLC SERPL CALC-MCNC: 76 MG/DL (ref ?–100)
LYMPHOCYTES # BLD AUTO: 3.28 X10(3) UL (ref 1–4)
LYMPHOCYTES NFR BLD AUTO: 40.9 %
MCH RBC QN AUTO: 28.5 PG (ref 26–34)
MCHC RBC AUTO-ENTMCNC: 33 G/DL (ref 31–37)
MCV RBC AUTO: 86.5 FL
MONOCYTES # BLD AUTO: 0.8 X10(3) UL (ref 0.1–1)
MONOCYTES NFR BLD AUTO: 10 %
NEUTROPHILS # BLD AUTO: 3.72 X10 (3) UL (ref 1.5–7.7)
NEUTROPHILS # BLD AUTO: 3.72 X10(3) UL (ref 1.5–7.7)
NEUTROPHILS NFR BLD AUTO: 46.3 %
NONHDLC SERPL-MCNC: 86 MG/DL (ref ?–130)
OSMOLALITY SERPL CALC.SUM OF ELEC: 292 MOSM/KG (ref 275–295)
PLATELET # BLD AUTO: 256 10(3)UL (ref 150–450)
POTASSIUM SERPL-SCNC: 4.1 MMOL/L (ref 3.5–5.1)
PROT SERPL-MCNC: 7.9 G/DL (ref 5.7–8.2)
RBC # BLD AUTO: 4.45 X10(6)UL
SODIUM SERPL-SCNC: 140 MMOL/L (ref 136–145)
TRIGL SERPL-MCNC: 46 MG/DL (ref 30–149)
TSI SER-ACNC: 0.66 MIU/ML (ref 0.55–4.78)
VLDLC SERPL CALC-MCNC: 7 MG/DL (ref 0–30)
WBC # BLD AUTO: 8 X10(3) UL (ref 4–11)

## 2024-08-27 PROCEDURE — 80061 LIPID PANEL: CPT

## 2024-08-27 PROCEDURE — 80053 COMPREHEN METABOLIC PANEL: CPT

## 2024-08-27 PROCEDURE — 36415 COLL VENOUS BLD VENIPUNCTURE: CPT

## 2024-08-27 PROCEDURE — 84443 ASSAY THYROID STIM HORMONE: CPT

## 2024-08-27 PROCEDURE — 85025 COMPLETE CBC W/AUTO DIFF WBC: CPT

## 2024-09-06 ENCOUNTER — OFFICE VISIT (OUTPATIENT)
Dept: FAMILY MEDICINE CLINIC | Facility: CLINIC | Age: 29
End: 2024-09-06
Payer: COMMERCIAL

## 2024-09-06 VITALS
HEART RATE: 62 BPM | WEIGHT: 169 LBS | SYSTOLIC BLOOD PRESSURE: 102 MMHG | DIASTOLIC BLOOD PRESSURE: 52 MMHG | RESPIRATION RATE: 16 BRPM | HEIGHT: 66.5 IN | BODY MASS INDEX: 26.84 KG/M2

## 2024-09-06 DIAGNOSIS — Z00.00 ANNUAL PHYSICAL EXAM: Primary | ICD-10-CM

## 2024-09-06 PROCEDURE — 3008F BODY MASS INDEX DOCD: CPT | Performed by: FAMILY MEDICINE

## 2024-09-06 PROCEDURE — 3074F SYST BP LT 130 MM HG: CPT | Performed by: FAMILY MEDICINE

## 2024-09-06 PROCEDURE — 3078F DIAST BP <80 MM HG: CPT | Performed by: FAMILY MEDICINE

## 2024-09-06 PROCEDURE — 99395 PREV VISIT EST AGE 18-39: CPT | Performed by: FAMILY MEDICINE

## 2024-09-06 RX ORDER — MULTIVIT WITH CALCIUM,IRON,MIN
1 TABLET ORAL DAILY
COMMUNITY

## 2024-09-06 NOTE — PROGRESS NOTES
Bryanna Kelsey is a 28 year old female who presents for a complete physical exam.   HPI:     Chief Complaint   Patient presents with    Physical        Her last annual assessment has been over 1 year: Annual Physical due on 2024        Symptoms: periods are regular. Patient complains of doing well.  in spring, no issues with 2 kids. .     Tobacco:  She has never smoked tobacco.     Wt Readings from Last 4 Encounters:   24 169 lb (76.7 kg)   24 173 lb 2 oz (78.5 kg)   24 175 lb 9.6 oz (79.7 kg)   05/15/24 194 lb 2 oz (88.1 kg)     Body mass index is 26.87 kg/m².     Labs:   Lab Results   Component Value Date/Time    WBC 8.0 2024 09:23 AM    HGB 12.7 2024 09:23 AM    .0 2024 09:23 AM      Lab Results   Component Value Date/Time    GLU 81 2024 09:23 AM     2024 09:23 AM    K 4.1 2024 09:23 AM     2024 09:23 AM    CO2 27.0 2024 09:23 AM    CREATSERUM 0.89 2024 09:23 AM    CA 9.9 2024 09:23 AM    ALB 4.6 2024 09:23 AM    TP 7.9 2024 09:23 AM    ALKPHO 96 2024 09:23 AM    AST 21 2024 09:23 AM    ALT 11 2024 09:23 AM    BILT 0.5 2024 09:23 AM    TSH 0.662 2024 09:23 AM        Lab Results   Component Value Date/Time    CHOLEST 153 2024 09:23 AM    HDL 67 (H) 2024 09:23 AM    TRIG 46 2024 09:23 AM    LDL 76 2024 09:23 AM    NONHDLC 86 2024 09:23 AM       No results found for: \"A1C\"   No results found for: \"VITD\"      No recommendations at this time    Current Outpatient Medications   Medication Sig Dispense Refill    Multiple Vitamins-Minerals (MULTIPLE VITAMINS/WOMENS) Oral Tab Take 1 tablet by mouth daily.        History reviewed. No pertinent past medical history.   Past Surgical History:   Procedure Laterality Date            Family History   Problem Relation Age of Onset    Thyroid disease Mother     Diabetes Father         Age 43  passed of heart failure    Heart Disorder Father       Social History:  Social History     Socioeconomic History    Marital status:    Tobacco Use    Smoking status: Never    Smokeless tobacco: Never   Vaping Use    Vaping status: Never Used   Substance and Sexual Activity    Alcohol use: Not Currently    Drug use: Never    Sexual activity: Not Currently     Partners: Male   Other Topics Concern    Caffeine Concern No    Exercise No    Seat Belt Yes         GYNE HISTORY:  G:  ,  P:     OB History    Para Term  AB Living   2 2 2 0 0 2   SAB IAB Ectopic Multiple Live Births   0 0 0 0 2        Hx of Pap: all Paps normal        Last Pap (date):  this year  Hx of STDs:   REVIEW OF SYSTEMS:   Review of Systems   Constitutional: Negative.  Negative for activity change, appetite change, chills and fever.   HENT: Negative.     Eyes: Negative.    Respiratory: Negative.  Negative for shortness of breath.    Cardiovascular: Negative.  Negative for chest pain and palpitations.   Gastrointestinal: Negative.  Negative for abdominal pain.   Genitourinary: Negative.  Negative for dysuria.   Musculoskeletal:  Negative for arthralgias.   Skin: Negative.  Negative for rash.   Allergic/Immunologic: Negative.    Neurological: Negative.         EXAM:   /52 (BP Location: Left arm)   Pulse 62   Resp 16   Ht 5' 6.5\" (1.689 m)   Wt 169 lb (76.7 kg)   LMP 09/15/2023 (Exact Date)   Breastfeeding Yes   BMI 26.87 kg/m²  Body mass index is 26.87 kg/m².   Physical Exam  Vitals and nursing note reviewed.   Constitutional:       General: She is not in acute distress.     Appearance: Normal appearance.   HENT:      Head: Normocephalic and atraumatic.      Right Ear: Tympanic membrane and external ear normal.      Left Ear: Tympanic membrane and external ear normal.      Nose: Nose normal.      Mouth/Throat:      Mouth: Mucous membranes are moist.   Eyes:      Extraocular Movements: Extraocular movements intact.       Pupils: Pupils are equal, round, and reactive to light.   Cardiovascular:      Rate and Rhythm: Normal rate and regular rhythm.      Pulses: Normal pulses.           Carotid pulses are 2+ on the right side and 2+ on the left side.       Radial pulses are 2+ on the right side and 2+ on the left side.        Dorsalis pedis pulses are 2+ on the right side and 2+ on the left side.        Posterior tibial pulses are 2+ on the right side and 2+ on the left side.      Heart sounds: Normal heart sounds, S1 normal and S2 normal. No murmur heard.  Pulmonary:      Effort: Pulmonary effort is normal.      Breath sounds: Normal breath sounds.   Abdominal:      General: Abdomen is flat. Bowel sounds are normal. There is no distension.      Palpations: Abdomen is soft.   Musculoskeletal:         General: Normal range of motion.      Cervical back: Normal range of motion and neck supple.      Right lower leg: No edema.      Left lower leg: No edema.   Skin:     General: Skin is warm and dry.      Capillary Refill: Capillary refill takes less than 2 seconds.   Neurological:      General: No focal deficit present.      Mental Status: She is alert and oriented to person, place, and time.   Psychiatric:         Mood and Affect: Mood normal.         Behavior: Behavior normal.         Thought Content: Thought content normal.          ASSESSMENT AND PLAN:   Bryanna Kelsey is a 28 year old female who presents for a complete physical exam.   Pt's weight is Body mass index is 26.87 kg/m²., recommended low fat diet and aerobic exercise 30 minutes three times weekly.   Health maintenance,   Up to date   Immunizations:  Up to date   Immunization History   Administered Date(s) Administered    Covid-19 Vaccine Pfizer 30 mcg/0.3 ml 03/27/2021, 04/18/2021, 01/16/2022    FLULAVAL 6 months & older 0.5 ml Prefilled syringe (40281) 09/30/2021    FLUZONE 6 months and older PFS 0.5 ml (48143) 09/30/2021    MMR 10/16/2021    TDAP 07/19/2021, 02/21/2024     Tb Intradermal Test 04/04/2022, 04/11/2022        Pt info given for: exercise, low fat diet, The patient indicates understanding of these issues and agrees to the plan.    Assessment:  Problem List Items Addressed This Visit    None  Visit Diagnoses       Annual physical exam    -  Primary           No follow-ups on file.    Leon Gillis MD, 9/6/2024, 11:25 AM

## 2025-06-27 ENCOUNTER — OFFICE VISIT (OUTPATIENT)
Dept: OBGYN CLINIC | Facility: CLINIC | Age: 30
End: 2025-06-27
Payer: COMMERCIAL

## 2025-06-27 VITALS
BODY MASS INDEX: 24.3 KG/M2 | DIASTOLIC BLOOD PRESSURE: 58 MMHG | WEIGHT: 153 LBS | HEART RATE: 95 BPM | SYSTOLIC BLOOD PRESSURE: 110 MMHG | HEIGHT: 66.5 IN

## 2025-06-27 DIAGNOSIS — N76.0 VAGINITIS AND VULVOVAGINITIS: Primary | ICD-10-CM

## 2025-06-27 PROCEDURE — 3008F BODY MASS INDEX DOCD: CPT | Performed by: OBSTETRICS & GYNECOLOGY

## 2025-06-27 PROCEDURE — 99214 OFFICE O/P EST MOD 30 MIN: CPT | Performed by: OBSTETRICS & GYNECOLOGY

## 2025-06-27 PROCEDURE — 81514 NFCT DS BV&VAGINITIS DNA ALG: CPT | Performed by: OBSTETRICS & GYNECOLOGY

## 2025-06-27 PROCEDURE — 3078F DIAST BP <80 MM HG: CPT | Performed by: OBSTETRICS & GYNECOLOGY

## 2025-06-27 PROCEDURE — 3074F SYST BP LT 130 MM HG: CPT | Performed by: OBSTETRICS & GYNECOLOGY

## 2025-06-27 NOTE — PATIENT INSTRUCTIONS
VISIT SUMMARY:    Today, you were seen for vaginal itching that started three days ago. The itching began after using a different soap and starting a new exercise routine. A physical exam suggested a yeast infection, and a swab will be performed to confirm the diagnosis.    YOUR PLAN:    VULVOVAGINAL CANDIDIASIS: You have symptoms of a yeast infection, likely due to using a different soap and starting a new exercise routine.  -A swab will be performed to confirm the diagnosis and rule out bacterial vaginosis and other types of yeast infections.  -If the yeast infection is confirmed, you will be prescribed clotrimazole cream or oral fluconazole (Diflucan).  -If the swab results are negative but symptoms persist, Metrogel will be considered.    VAGINAL HYGIENE AND CARE: Your symptoms may be related to irritation from soap and tight, sweaty clothing.  -Avoid using soap inside the vagina; use it only on the external genitalia.  -Change out of tight, sweaty clothes immediately after exercising.  -Wear loose clothing at night and avoid underwear to reduce irritation.

## 2025-06-27 NOTE — PROGRESS NOTES
[facility]  Obstetrics and Gynecology  History & Physical    The following individual(s) verbally consented to be recorded using ambient AI listening technology and understand that they can each withdraw their consent to this listening technology at any point by asking the clinician to turn off or pause the recording:    Patient name: Bryanna Kelsey    CC: Follow up appointment - Vaginal Itching    Subjective:     History of Present Illness  Bryanna Kelsey is a 29 year old  female who presents with vaginal itching.    She has been experiencing vaginal itching for approximately three days without significant discharge, although she has had more discharge in the past. The itching began shortly after using Dove soap, which she typically avoids due to sensitivity. She used it once due to running low on her usual soap and believes it may have contributed to the irritation.    She recently started working out, wearing tight, sweaty spandex clothing, and notes that the weather has been around ninety degrees, which may have exacerbated the issue. No new sexual activity, changes in birth control, or use of new condoms, as she has not been sexually active for three months.    No urinary symptoms such as pain during urination or increased frequency, although she mentions a burning sensation attributed to skin agitation rather than urinary issues. Regarding personal care, she usually opts for waxing but has not done so recently, and she avoids shaving to prevent further irritation.      OB History:  OB History    Para Term  AB Living   2 2 2 0 0 2   SAB IAB Ectopic Multiple Live Births   0 0 0 0 2       Gyne History:     Patient's last menstrual period was 2025 (approximate).    Pap smear history:  2024 - NILM     Denies history of STDs (non-HPV).   Currently sexually active   Birth control: partner just got vasectomy     Review of Systems:  General: no complaints per category. See  HPI for additional information.   Breast: no complaints per category. See HPI for additional information.   Respiratory: no complaints per category. See HPI for additional information.   Cardiovascular: no complaints per category. See HPI for additional information.   GI: no complaints per category. See HPI for additional information.   : no complaints per category. See HPI for additional information.   Heme: no complaints per category. See HPI for additional information.       Objective:     Vitals:    25 1349   BP: 110/58   Pulse: 95   Weight: 153 lb (69.4 kg)   Height: 66.5\"         Body mass index is 24.32 kg/m².    General: AAO.NAD.   CVS exam: normal peripheral perfusion  Chest: non-labored breathing, no tachypnea   Breast: deferred  Abdominal exam: soft, nontender, nondistended  Pelvic exam:   VULVA: normal appearing vulva with no masses, tenderness or lesions  PERINEUM:  normal appearing, no lesions   URETHRAL MEATUS:  normal appearing, no lesions   VAGINA: normal appearing vagina with normal color and discharge, no lesions  CERVIX: normal appearing cervix without lesions. Thick white discharge in vault.   UTERUS: uterus is normal size, shape, consistency and nontender  ADNEXA: normal adnexa in size, nontender and no masses  PERIRECTAL: normal appearing, no lesions   Ext: non-tender, no edema    Assessment:     Bryanna Kelsey is a 29 year old  female here for follow up regarding vaginal itching.     Plan:     Problem List Items Addressed This Visit    None      Assessment & Plan  Vulvovaginal Candidiasis  Presents with vulvar pruritus and white discharge for three days, likely due to vulvovaginal candidiasis. Symptoms began after using a different soap and starting a new exercise routine, which may have contributed to irritation. Reports no urinary symptoms or recent sexual activity. Physical exam revealed white discharge, suggestive of a yeast infection. A swab will be performed to confirm  the diagnosis and rule out bacterial vaginosis and other types of yeast infections. Discussed treatment options including clotrimazole cream and oral fluconazole (Diflucan). Informed that fluconazole is minimally transmitted during breastfeeding. Metrogel will be considered if swab results are negative but symptoms persist.  - Perform vaginosis swab to confirm diagnosis and rule out bacterial vaginosis and other yeast infections.  - Prescribe clotrimazole cream or oral fluconazole (Diflucan) if yeast infection is confirmed.  - Consider Metrogel if swab results are negative but symptoms persist.  - Patient is breastfeeding and prefers vaginal cream/gel for treatment.     Vaginal Hygiene and Care  Has been using a soap that may have caused irritation. Advised against using soap inside the vagina to maintain vaginal pH balance. Advised to change out of tight, sweaty clothes after exercising to prevent irritation. Discussed the importance of avoiding fragrant body soaps and oils that may cause irritation.  - Advise against using soap inside the vagina; use only on the external genitalia.  - Recommend changing out of tight, sweaty clothes immediately after exercising.  - Advise wearing loose clothing at night and avoiding underwear to reduce irritation.      All of the findings and plan were discussed with the patient.  She notes understanding and agrees with the plan of care.  All questions were answered to the best of my ability at this time.      RTC or an annual exam or sooner if needed     Abram Sky MD   [facility] - OBGYN      Note to patient and family   The 21st Century Cures Act makes medical notes available to patients in the interest of transparency.  However, please be advised that this is a medical document.  It is intended as [id number] communication.  It is written and medical language may contain abbreviations or verbiage that are technical and unfamiliar.  It may appear blunt or direct.  Medical documents  are intended to carry relevant information, facts as evident, and the clinical opinion of the practitioner.           This note could include assistance by Dragon voice recognition. Errors in content may be related to improper recognition by the system; efforts to review and correct have been done but errors may still exist.

## 2025-06-28 LAB
BV BACTERIA DNA VAG QL NAA+PROBE: NEGATIVE
C GLABRATA DNA VAG QL NAA+PROBE: NEGATIVE
C KRUSEI DNA VAG QL NAA+PROBE: NEGATIVE
CANDIDA DNA VAG QL NAA+PROBE: POSITIVE
T VAGINALIS DNA VAG QL NAA+PROBE: NEGATIVE

## 2025-06-30 RX ORDER — CLOTRIMAZOLE 1 %
5 CREAM WITH APPLICATOR VAGINAL NIGHTLY
Qty: 45 G | Refills: 2 | Status: SHIPPED | OUTPATIENT
Start: 2025-06-30

## (undated) DIAGNOSIS — Z00.00 LABORATORY EXAMINATION ORDERED AS PART OF A ROUTINE GENERAL MEDICAL EXAMINATION: Primary | ICD-10-CM

## (undated) DEVICE — LARGE, DISPOSABLE ALEXIS O C-SECTION PROTECTOR - RETRACTOR: Brand: ALEXIS ® O C-SECTION PROTECTOR - RETRACTOR

## (undated) NOTE — Clinical Note
Please change scheduled CS on  to IOL.  IOL request: Request IOL at/between this GA: 40wks Estimated Date of Delivery: 5/15/24 Indication for IOL:  post dates, desires TOLAC Time of appointment: am Cervical ripening with cytotec: no IOL with pitocin: yes, per protocol OB history/complications: history of prior

## (undated) NOTE — LETTER
Dear New MomJenni, we missed you! The nurses of [facility] have tried to reach you by phone to ask if you have any questions regarding your health or the health and care of your new little one.    We hope you are doing well. If, for any reason, you have questions or concerns about your health or your baby’s health, please contact your provider or your pediatrician or family medicine physician regarding your baby.     At [facility], we feel that postpartum support is very important for new families. Please see the enclosed new parent support flyer that lists support programs and resources with both in-person and online options.     Additionally, our Breastfeeding Centers at [facility] and [facility] in [place], offer outpatient visits with our International Board-Certified Lactation   Consultants (IBCLCs) for any breastfeeding concerns or questions you may have.    For issues related to stress, anxiety or depression, we have a Nurturing Mom support group that meets both in-person or online.  There’s also a 24-hour Mom’s Line where you can request a phone call from a clinical therapist for assistance for postpartum depression.    We encourage you to take advantage of these programs and resources as you recover from childbirth and learn to care for your new infant.    Best wishes,    [facility]            [phone]

## (undated) NOTE — LETTER
04/15/24    Re: Bryanna Kelsey  : 1995    To Whom It May Concern:  Bryanna Kelsey is under our care for her pregnancy with an Estimated Date of Delivery: 5/15/24. She was seen in the office today, 4/15/2024, for a prenatal appointment at 35w5d.   If you have any questions concerning this letter, please feel free to contact my office.      Sincerely yours,    Tiffanie Jimenez MD  [facility]

## (undated) NOTE — LETTER
Patient Name: Vianca Patel  : 1995  MRN: [id number]  Patient Address[place] [facility] [place] [address]      Coronavirus Disease 2019 (COVID-19)     [facility] is committed to the safety and well-being of our patients, members, carefully. If your symptoms get worse, call your healthcare provider immediately. 3. Get rest and stay hydrated.    4. If you have a medical appointment, call the healthcare provider ahead of time and tell them that you have or may have COVID-19.  5. For m of fever-reducing medications; and  · Improvement in respiratory symptoms (e.g., cough, shortness of breath); and  · At least 10 days have passed since symptoms first appeared OR if asymptomatic patient or date of symptom onset is unclear then use 10 days donors must:    · Have had a confirmed diagnosis of COVID-19  · Be symptom-free for at least 14 days*    *Some people will be required to have a repeat COVID-19 test in order to be eligible to donate.  If you’re instructed by Stephanie that a repeat test is r random. Researchers are trying to identify similarities between people with a Post-COVID condition to better understand if there are risk factors. How do I prevent a Post-COVID condition?   The best way to prevent the long-term symptoms of COVID-19 is

## (undated) NOTE — LETTER
7/19/2021              Bryanna Kelsey        [address] [address]         To Whom It May Concern,  I have recommended that Bryanna reduce hours to 6.45 hours per day to allow for time to drink water and for bathroom breaks due

## (undated) NOTE — LETTER
5/3/2021              Bryanna Kelsey        [address] [address]         To Whom It May Concern,  I have recommended to [facility] that she avoid repetitive heavy lifting more than 20 lbs and avoid Max assist due to her pregnancy

## (undated) NOTE — LETTER
21    Re: Matias Kebede  : 1995    To Whom It May Concern:  Matias Kebede is under my care for pregnancy with an Estimated Date of Delivery: 10/8/21  At this time, if accommodations can be made, we recommend that she avoid caring for